# Patient Record
Sex: MALE | Race: WHITE | Employment: OTHER | URBAN - METROPOLITAN AREA
[De-identification: names, ages, dates, MRNs, and addresses within clinical notes are randomized per-mention and may not be internally consistent; named-entity substitution may affect disease eponyms.]

---

## 2017-02-23 ENCOUNTER — OFFICE VISIT (OUTPATIENT)
Dept: FAMILY MEDICINE CLINIC | Facility: CLINIC | Age: 72
End: 2017-02-23

## 2017-02-23 ENCOUNTER — APPOINTMENT (OUTPATIENT)
Dept: LAB | Age: 72
End: 2017-02-23
Attending: FAMILY MEDICINE
Payer: COMMERCIAL

## 2017-02-23 VITALS
HEART RATE: 60 BPM | WEIGHT: 228 LBS | HEIGHT: 67.75 IN | BODY MASS INDEX: 34.96 KG/M2 | DIASTOLIC BLOOD PRESSURE: 70 MMHG | SYSTOLIC BLOOD PRESSURE: 134 MMHG

## 2017-02-23 DIAGNOSIS — E66.9 OBESITY (BMI 30.0-34.9): ICD-10-CM

## 2017-02-23 DIAGNOSIS — E11.9 TYPE 2 DIABETES MELLITUS WITHOUT COMPLICATION, WITHOUT LONG-TERM CURRENT USE OF INSULIN (HCC): ICD-10-CM

## 2017-02-23 DIAGNOSIS — Z23 NEED FOR VACCINATION: ICD-10-CM

## 2017-02-23 DIAGNOSIS — R53.83 FATIGUE, UNSPECIFIED TYPE: ICD-10-CM

## 2017-02-23 DIAGNOSIS — E11.9 TYPE 2 DIABETES MELLITUS WITHOUT COMPLICATION, WITHOUT LONG-TERM CURRENT USE OF INSULIN (HCC): Primary | ICD-10-CM

## 2017-02-23 DIAGNOSIS — I10 ESSENTIAL HYPERTENSION: ICD-10-CM

## 2017-02-23 DIAGNOSIS — G47.33 OBSTRUCTIVE SLEEP APNEA: ICD-10-CM

## 2017-02-23 LAB
EST. AVERAGE GLUCOSE BLD GHB EST-MCNC: 169 MG/DL (ref 68–126)
HBA1C MFR BLD HPLC: 7.5 % (ref ?–5.7)

## 2017-02-23 PROCEDURE — 90471 IMMUNIZATION ADMIN: CPT | Performed by: FAMILY MEDICINE

## 2017-02-23 PROCEDURE — 36415 COLL VENOUS BLD VENIPUNCTURE: CPT

## 2017-02-23 PROCEDURE — 90670 PCV13 VACCINE IM: CPT | Performed by: FAMILY MEDICINE

## 2017-02-23 PROCEDURE — 83036 HEMOGLOBIN GLYCOSYLATED A1C: CPT

## 2017-02-23 PROCEDURE — 99203 OFFICE O/P NEW LOW 30 MIN: CPT | Performed by: FAMILY MEDICINE

## 2017-02-23 NOTE — PROGRESS NOTES
HPI:   #1 DM control uncertain  James Olivier is a 73year old male who presents as a new patient for recheck of his diabetes. Patient’s FBS have been not done patient does not have a meter at home.   Has not been to diabetic or dietitian counseling wo mother passed at 80 natural causes grandparents history not available      Component      Latest Ref Rng 12/27/2016   WBC      4.0-13.0 x10(3) uL 9.8   RBC      3.80-5.80 x10(6)uL 5.32   Hemoglobin      13.0-17.0 g/dL 15.7   Hematocrit      37.0-53.0 % 48. <2.0   NITRITE, URINE      Negative Negative   LEUKOCYTES      Negative Negative   MICROSCOPIC EXAMINATION       Microscopic not indicated   CHOLESTEROL, TOTAL      <200 mg/dL 189   Triglycerides      <150 mg/dL 102   HDL Cholesterol      >45 mg/dL 59   LD Past Medical History   Diagnosis Date   • Essential hypertension    • Diabetes [facility])           Past Surgical History    HERNIA SURGERY      TONSILLECTOMY      OTHER SURGICAL HISTORY        Social History:   Smoking Status: Never Smoker this Visit:  No prescriptions requested or ordered in this encounter    Imaging & Consults:  PNEUMOCOCCAL VACC, 13 MAURICIO IM  1.  Type 2 diabetes mellitus without complication, without long-term current use of insulin (HCC)    Diabetic control is unknown proba

## 2017-02-23 NOTE — PROGRESS NOTES
Quick Note:    Patient does have an elevated hemoglobin A1c above 7. As discussed in the office since it is elevated refer patient to dietitian and diabetic nurse. We will discuss starting additional medication at his follow-up in 1 week.  Repeat hemoglobin

## 2017-02-24 ENCOUNTER — TELEPHONE (OUTPATIENT)
Dept: FAMILY MEDICINE CLINIC | Facility: CLINIC | Age: 72
End: 2017-02-24

## 2017-02-24 DIAGNOSIS — E66.9 OBESITY (BMI 30.0-34.9): ICD-10-CM

## 2017-02-24 DIAGNOSIS — J45.909 UNCOMPLICATED ASTHMA, UNSPECIFIED ASTHMA SEVERITY: ICD-10-CM

## 2017-02-24 DIAGNOSIS — I25.119 CORONARY ARTERY DISEASE WITH ANGINA PECTORIS, UNSPECIFIED VESSEL OR LESION TYPE, UNSPECIFIED WHETHER NATIVE OR TRANSPLANTED HEART (HCC): ICD-10-CM

## 2017-02-24 DIAGNOSIS — G47.33 OBSTRUCTIVE SLEEP APNEA: ICD-10-CM

## 2017-02-24 DIAGNOSIS — E11.9 TYPE 2 DIABETES MELLITUS WITHOUT COMPLICATION, WITHOUT LONG-TERM CURRENT USE OF INSULIN (HCC): Primary | ICD-10-CM

## 2017-02-24 DIAGNOSIS — I10 ESSENTIAL HYPERTENSION: ICD-10-CM

## 2017-02-24 PROBLEM — E66.811 OBESITY (BMI 30.0-34.9): Status: ACTIVE | Noted: 2017-02-24

## 2017-02-24 NOTE — TELEPHONE ENCOUNTER
----- Message from Aisha Zhao PA-C sent at 2/23/2017  4:22 PM CST -----  Patient does have an elevated hemoglobin A1c above 7. As discussed in the office since it is elevated refer patient to dietitian and diabetic nurse.   We will discuss starting

## 2017-02-24 NOTE — TELEPHONE ENCOUNTER
Referrals in system for dietician and diabetic nurse. Lab in for 3 months. lmom for pt with detailed instructions and left him the number to the diabetic ed center to contact.   Also suggested that if he has any questions he can also discuss this with Frantz

## 2017-02-25 PROBLEM — E11.9 TYPE 2 DIABETES MELLITUS WITHOUT COMPLICATION, WITHOUT LONG-TERM CURRENT USE OF INSULIN (HCC): Status: ACTIVE | Noted: 2017-02-25

## 2017-02-25 PROBLEM — R53.83 FATIGUE: Status: ACTIVE | Noted: 2017-02-25

## 2017-02-28 ENCOUNTER — TELEPHONE (OUTPATIENT)
Dept: FAMILY MEDICINE CLINIC | Facility: CLINIC | Age: 72
End: 2017-02-28

## 2017-02-28 DIAGNOSIS — IMO0001 UNCONTROLLED TYPE 2 DIABETES MELLITUS WITHOUT COMPLICATION, WITHOUT LONG-TERM CURRENT USE OF INSULIN: Primary | ICD-10-CM

## 2017-03-01 NOTE — TELEPHONE ENCOUNTER
Per Fifi Stone PA-C, on 02/28/2017,the patient's eye doctor, Ginger Cantor O.D., was contacted in order to confirm or deny a diagnosis of glaucoma.   The patient's chart lists him as having glaucoma, but his most recent eye exam report stated that g

## 2017-03-02 ENCOUNTER — OFFICE VISIT (OUTPATIENT)
Dept: FAMILY MEDICINE CLINIC | Facility: CLINIC | Age: 72
End: 2017-03-02

## 2017-03-02 VITALS
WEIGHT: 228 LBS | HEIGHT: 67.75 IN | DIASTOLIC BLOOD PRESSURE: 70 MMHG | SYSTOLIC BLOOD PRESSURE: 142 MMHG | BODY MASS INDEX: 34.96 KG/M2 | HEART RATE: 68 BPM

## 2017-03-02 DIAGNOSIS — I51.7 LVH (LEFT VENTRICULAR HYPERTROPHY): ICD-10-CM

## 2017-03-02 DIAGNOSIS — R53.83 FATIGUE, UNSPECIFIED TYPE: ICD-10-CM

## 2017-03-02 DIAGNOSIS — Z11.59 NEED FOR HEPATITIS C SCREENING TEST: ICD-10-CM

## 2017-03-02 DIAGNOSIS — E66.9 OBESITY (BMI 30.0-34.9): ICD-10-CM

## 2017-03-02 DIAGNOSIS — H91.8X9 OTHER SPECIFIED FORMS OF HEARING LOSS, UNSPECIFIED LATERALITY: ICD-10-CM

## 2017-03-02 DIAGNOSIS — G47.33 OBSTRUCTIVE SLEEP APNEA: ICD-10-CM

## 2017-03-02 DIAGNOSIS — R93.1 ABNORMAL SCREENING CT OF HEART: ICD-10-CM

## 2017-03-02 DIAGNOSIS — D12.6 BENIGN NEOPLASM OF COLON, UNSPECIFIED PART OF COLON: ICD-10-CM

## 2017-03-02 DIAGNOSIS — Z00.00 ENCOUNTER FOR ANNUAL HEALTH EXAMINATION: Primary | ICD-10-CM

## 2017-03-02 DIAGNOSIS — I25.83 CORONARY ATHEROSCLEROSIS DUE TO LIPID RICH PLAQUE: ICD-10-CM

## 2017-03-02 DIAGNOSIS — I25.10 CORONARY ATHEROSCLEROSIS DUE TO LIPID RICH PLAQUE: ICD-10-CM

## 2017-03-02 DIAGNOSIS — J45.909 UNCOMPLICATED ASTHMA, UNSPECIFIED ASTHMA SEVERITY: ICD-10-CM

## 2017-03-02 DIAGNOSIS — I10 ESSENTIAL HYPERTENSION: ICD-10-CM

## 2017-03-02 DIAGNOSIS — IMO0001 UNCONTROLLED TYPE 2 DIABETES MELLITUS WITHOUT COMPLICATION, WITHOUT LONG-TERM CURRENT USE OF INSULIN: ICD-10-CM

## 2017-03-02 PROBLEM — H91.90 IMPAIRED HEARING: Status: ACTIVE | Noted: 2017-03-02

## 2017-03-02 PROBLEM — E11.9 TYPE 2 DIABETES MELLITUS WITHOUT COMPLICATION, WITHOUT LONG-TERM CURRENT USE OF INSULIN (HCC): Status: RESOLVED | Noted: 2017-02-25 | Resolved: 2017-03-02

## 2017-03-02 PROCEDURE — G0438 PPPS, INITIAL VISIT: HCPCS | Performed by: FAMILY MEDICINE

## 2017-03-02 NOTE — TELEPHONE ENCOUNTER
Per Della Aguilar PA-C, , the patient's previous PCP, Cindy Valentine MD, FACP, was contacted in order to request any cariology testing or consults that we could review to determine if the patient has CAD and/or triple vessel disease.   As it stands, Mi

## 2017-03-02 NOTE — PROGRESS NOTES
HPI:   Fredi Baum is a 73year old male who presents for a Medicare Initial Annual Wellness visit (Once after 12 month Medicare anniversery) . 1.  Essential hypertension  LVH  - Taking lisinopril 5 mg Daily   - Last echo with LVH in 2013, will re Problem List:     Essential hypertension     Asthma     Benign neoplasm of colon     Obstructive sleep apnea     Obesity (BMI 30.0-34. 9)     Fatigue     Uncontrolled type 2 diabetes mellitus without complication, without long-term current use of insulin (H includes tonsillectomy; wrist fracture surgery (Left, 4605-7118); hernia surgery (1697-2884); and colonoscopy (2017). His family history includes Colon Problems in his father. SOCIAL HISTORY:   He  reports that he has never smoked.  He has never used s Clear to auscultation bilaterally, respirations unlabored   Chest Wall:  No tenderness or deformity   Heart:  Regular rate and rhythm, S1, S2 normal, no murmur, rub or gallop   Abdomen:   Soft, non-tender, bowel sounds active all four quadrants,  no masses (CPT=93306); Future    7. Abnormal screening CT of heart  Coronary atherosclerosis due to lipid rich plaque  Repeat Ultrafast CT of the heart last one was done at [place] [address] on 2/27/1 to an calcification score was 132  8.  Benign neoplasm o you take aspirin?: Yes    Have you had any immunizations at another office such as Influenza, Hepatitis B, Tetanus, or Pneumococcal?: No     Functional Ability     Bathing or Showering: Able without help    Toileting: Able without help    Dressing: Able wi \"Flag\": Correct    Recall \"Tree\": Correct       This section provided for quick review of chart, separate sheet to patient  PREVENTATIVE SERVICES  INDICATIONS AND SCHEDULE Internal Lab or Procedure External Lab or Procedure   Diabetes Screening      Hb flowsheet data found. Creatinine  Annually CREATININE (mg/dL)   Date Value   12/27/2016 1.10    No flowsheet data found. Drug Serum Conc  Annually No results found for: DIGOXIN, DIG, VALP No flowsheet data found.     Diabetes      HgbA1C  Annually HGB

## 2017-03-02 NOTE — PATIENT INSTRUCTIONS
SCHEDULING [facility] LAB APPOINTMENTS ONLINE    Lab appointments can now be scheduled online at [email or web address]    · Go to [email or web address]  · In Search type Lab  · Click \"Lab services\"  · Click \"Schedule Your Test Online\"  · Follow the prompts  · If you a member of your immediate family has had colon cancer, especially if their cancer occurred before they were 48years old. Prostate cancer tests:  The older way of looking for prostate cancer, the rectal exam, is no longer regarded as the best way to scre and urine tests. When you have no symptoms of illness, you should discuss the pros and cons of these and other tests with your healthcare provider. Each test involves some expense. What shots do I need?    The following shots are recommended for adults: expect your healthcare provider to advise you regularly on other ways to stay healthy. Some of these may include:   Substance use: Do not use tobacco or illegal drugs.  Avoid using alcohol while driving, swimming, boating, etc.   Diet and exercise: Try to m Family history of diabetes   • Age 72 years or older   • History of gestational diabetes or birth of baby weighing more than 9 pounds     Covered at least every 3 years,           GLUCOSE (mg/dL)   Date Value   12/27/2016 101*   ---------- Medicare covers 75 then as needed or DARRYL annually to 75 PSA due on 12/27/2018  No results found for this or any previous visit.    Annually (age 48 or over), DARRYL not paid separately when covered E/M service is provided on same date    Immunizations      Influenza  Covered computer and printer. (the forms are also available in [address])  [email or web address] org  This link also has information from the [facility] regarding Advance Directives.

## 2017-03-03 NOTE — TELEPHONE ENCOUNTER
A message was left on both the patient's cell and home voicemail notifying him that we received the information we requested from his previous physician. He was asked to call back to go over the information.

## 2017-03-07 NOTE — TELEPHONE ENCOUNTER
Evelyn Aguayo returned your call and said he will be available tomorrow before 10:00 and after 10:40 (he has a meeting) to talk to you about the records you received. He can be reached at [address] [facility].

## 2017-03-08 ENCOUNTER — NURSE ONLY (OUTPATIENT)
Dept: ENDOCRINOLOGY CLINIC | Facility: CLINIC | Age: 72
End: 2017-03-08

## 2017-03-08 VITALS — BODY MASS INDEX: 35 KG/M2 | DIASTOLIC BLOOD PRESSURE: 68 MMHG | WEIGHT: 227 LBS | SYSTOLIC BLOOD PRESSURE: 134 MMHG

## 2017-03-08 DIAGNOSIS — E11.9 TYPE 2 DIABETES MELLITUS WITHOUT COMPLICATION, WITHOUT LONG-TERM CURRENT USE OF INSULIN (HCC): Primary | ICD-10-CM

## 2017-03-08 PROCEDURE — G0108 DIAB MANAGE TRN  PER INDIV: HCPCS | Performed by: DIETITIAN, REGISTERED

## 2017-03-09 NOTE — PROGRESS NOTES
Raymond Boucher  : 1945 attended Step 1 Diabetic Education:    Date: 3/8/2017   Start time: 6:30 End time: 8pm    /68 mmHg  Wt 227 lb      HGBA1C (%)   Date Value   2017 7.5*   ----------     Depression Screen - PHQ-2 SCORE: 0    Diabe Pre-meal  2 Hour Post-prandial 140-180  Demonstrated ability to perform blood glucose testing on: [employer] Cont[employer] next EZ  Glucose today was 126 mg/dL    Problem Solving: Prevention,detection and treatment of acute complications: taught symptoms of hyp

## 2017-03-10 NOTE — TELEPHONE ENCOUNTER
Per Lexi Pulido PA-C, the patient was informed it looks like his previous UFCT from 02/27/2012, they labeled him as having triple vessel disease because he had plaque in is right main coronary artery, left anterior descending coronary artery, and cir

## 2017-03-13 ENCOUNTER — TELEPHONE (OUTPATIENT)
Dept: FAMILY MEDICINE CLINIC | Facility: CLINIC | Age: 72
End: 2017-03-13

## 2017-03-22 ENCOUNTER — HOSPITAL ENCOUNTER (OUTPATIENT)
Dept: CARDIOLOGY CLINIC | Facility: HOSPITAL | Age: 72
Discharge: HOME | End: 2017-03-22
Attending: INTERNAL MEDICINE

## 2017-03-22 DIAGNOSIS — Z13.9 VISIT FOR SCREENING: ICD-10-CM

## 2017-03-23 NOTE — PROGRESS NOTES
Quick Note:    In 1 year repeat carotid artery ultrasound due to carotid stenosis less than 50%. Narrowing is less than 50% both sides.   ______

## 2017-03-24 ENCOUNTER — TELEPHONE (OUTPATIENT)
Dept: FAMILY MEDICINE CLINIC | Facility: CLINIC | Age: 72
End: 2017-03-24

## 2017-03-24 NOTE — TELEPHONE ENCOUNTER
----- Message from Meng Beck PA-C sent at 3/23/2017  5:48 PM CDT -----  In 1 year repeat carotid artery ultrasound due to carotid stenosis less than 50%. Narrowing is less than 50% both sides.

## 2017-03-30 ENCOUNTER — HOSPITAL ENCOUNTER (OUTPATIENT)
Dept: CT IMAGING | Facility: HOSPITAL | Age: 72
Discharge: HOME | End: 2017-03-30
Attending: INTERNAL MEDICINE

## 2017-03-30 DIAGNOSIS — Z13.89 ENCOUNTER FOR SCREENING FOR OTHER DISORDER: ICD-10-CM

## 2017-04-08 NOTE — PROGRESS NOTES
Quick Note:    Patient is to follow-up in the office has several incidental findings:   #1lung nodules small will need repeat CT chest in 12 months if indicated may need a dedicated CT of the chest will discuss at office visit.   #2Degenerative disc disease

## 2017-04-11 ENCOUNTER — TELEPHONE (OUTPATIENT)
Dept: FAMILY MEDICINE CLINIC | Facility: CLINIC | Age: 72
End: 2017-04-11

## 2017-04-11 NOTE — TELEPHONE ENCOUNTER
----- Message from Fifi Stone PA-C sent at 4/8/2017  7:45 AM CDT -----  Patient is to follow-up in the office has several incidental findings:   #1lung nodules small will need repeat CT chest in 12 months if indicated may need a dedicated CT of the

## 2017-04-11 NOTE — TELEPHONE ENCOUNTER
A message was left on the patient's confidential voicemail asking the patient to call back to schedule a follow-up appointment with Marlin Ulrich regarding his test results.

## 2017-04-12 NOTE — TELEPHONE ENCOUNTER
Leonardo Tran returned call. I told him we need to make follow up appointment and Monday may be possible. Can we please put him in Monday Endy Xiong? He said he would be able to come in at 11:30.

## 2017-04-17 ENCOUNTER — OFFICE VISIT (OUTPATIENT)
Dept: FAMILY MEDICINE CLINIC | Facility: CLINIC | Age: 72
End: 2017-04-17

## 2017-04-17 VITALS
BODY MASS INDEX: 34.5 KG/M2 | HEART RATE: 88 BPM | WEIGHT: 225 LBS | HEIGHT: 67.75 IN | DIASTOLIC BLOOD PRESSURE: 70 MMHG | SYSTOLIC BLOOD PRESSURE: 116 MMHG

## 2017-04-17 DIAGNOSIS — R93.1 ABNORMAL CT SCAN, HEART: ICD-10-CM

## 2017-04-17 DIAGNOSIS — I25.83 CORONARY ATHEROSCLEROSIS DUE TO LIPID RICH PLAQUE: ICD-10-CM

## 2017-04-17 DIAGNOSIS — I25.10 CORONARY ATHEROSCLEROSIS DUE TO LIPID RICH PLAQUE: ICD-10-CM

## 2017-04-17 DIAGNOSIS — R91.1 LUNG NODULE < 6CM ON CT: ICD-10-CM

## 2017-04-17 DIAGNOSIS — I77.9 BILATERAL CAROTID ARTERY DISEASE (HCC): ICD-10-CM

## 2017-04-17 DIAGNOSIS — H91.93 HEARING DECREASED, BILATERAL: ICD-10-CM

## 2017-04-17 DIAGNOSIS — I77.810 DILATION OF THORACIC AORTA (HCC): Primary | ICD-10-CM

## 2017-04-17 DIAGNOSIS — M48.10 DISH (DIFFUSE IDIOPATHIC SKELETAL HYPEROSTOSIS): ICD-10-CM

## 2017-04-17 PROCEDURE — 99214 OFFICE O/P EST MOD 30 MIN: CPT | Performed by: FAMILY MEDICINE

## 2017-04-17 NOTE — PROGRESS NOTES
Yolie Connors is a 73year old male. HPI:   Patient is here today reveal Ultrafast CT test results and complains of hearing difficulty. .  Patient is to follow-up in the office has several incidental findings:     #1lung nodule 4 mm no personal history Aorta(__4.0-4.1__cm).  If indicated, recommend CT angiogram of thoracic aorta for further evaluation.    Dictated by: Jennifer Mcguire MD on 4/05/2017          Current Outpatient Prescriptions:  INDIRA MICROLET LANCETS Does not apply Misc Test twice daily Dx rashes  RESPIRATORY: denies shortness of breath with exertion  CARDIOVASCULAR: denies chest pain on exertion  GI: denies abdominal pain and denies heartburn  NEURO: denies headaches  Musculoskeletal: No motor deficits  EXAM:   /70 mmHg  Pulse 88  Ht - Cardio Referral - In Network    4. Abnormal CT scan, heart  - Cardio Referral - In Network  calcification score was 132 in 2012 is now 362 will be referring to cardiology    5. Hearing decreased, bilateral  - ENT Referral - In Network    6.  DISH (diffu

## 2017-04-19 ENCOUNTER — NURSE ONLY (OUTPATIENT)
Dept: ENDOCRINOLOGY CLINIC | Facility: CLINIC | Age: 72
End: 2017-04-19

## 2017-04-19 DIAGNOSIS — IMO0001 UNCONTROLLED TYPE 2 DIABETES MELLITUS WITHOUT COMPLICATION, WITHOUT LONG-TERM CURRENT USE OF INSULIN: Primary | ICD-10-CM

## 2017-04-19 PROCEDURE — G0109 DIAB MANAGE TRN IND/GROUP: HCPCS | Performed by: DIETITIAN, REGISTERED

## 2017-04-20 NOTE — PROGRESS NOTES
Heber Efrem  [id number] attended Step 2 Diabetic Education:  Pt.'s wife accompanied him to class    Date: 4/19/2017  Start time: 5:30pm End time: 7:30pm      Diabetes Overview, pathophysiology, pre-diabetes, A1C results and treatment options for barbie

## 2017-05-08 ENCOUNTER — TELEPHONE (OUTPATIENT)
Dept: FAMILY MEDICINE CLINIC | Facility: CLINIC | Age: 72
End: 2017-05-08

## 2017-05-23 ENCOUNTER — OFFICE VISIT (OUTPATIENT)
Dept: FAMILY MEDICINE CLINIC | Facility: CLINIC | Age: 72
End: 2017-05-23

## 2017-05-23 VITALS
DIASTOLIC BLOOD PRESSURE: 76 MMHG | BODY MASS INDEX: 34.34 KG/M2 | HEART RATE: 84 BPM | HEIGHT: 67.75 IN | SYSTOLIC BLOOD PRESSURE: 140 MMHG | WEIGHT: 224 LBS

## 2017-05-23 DIAGNOSIS — IMO0001 UNCONTROLLED TYPE 2 DIABETES MELLITUS WITHOUT COMPLICATION, WITHOUT LONG-TERM CURRENT USE OF INSULIN: ICD-10-CM

## 2017-05-23 DIAGNOSIS — I10 ESSENTIAL HYPERTENSION: ICD-10-CM

## 2017-05-23 DIAGNOSIS — L84 PRE-ULCERATIVE CORN OR CALLOUS: Primary | ICD-10-CM

## 2017-05-23 PROCEDURE — 99213 OFFICE O/P EST LOW 20 MIN: CPT | Performed by: FAMILY MEDICINE

## 2017-05-23 NOTE — PROGRESS NOTES
Sarah Faustin is a 73year old male. HPI:   #1 callus on right small toe  10 days ago developed a callous and thickening of the skin small toe patient does walk a lot and does have diabetes is concerned.   Has no numbness, tingling or pain unless the sh Oral Tab Take 1 tablet by mouth daily.  Disp:  Rfl:    METFORMIN HCL 1000 MG Oral Tab TAKE 1 TABLET BY MOUTH TWICE DAILY Disp: 60 tablet Rfl: 5      Past Medical History   Diagnosis Date   • Essential hypertension    • Diabetes ([facility] [address]    • Wrist fracture, le 3      Sig: Take 10 mg by mouth every morning. Imaging & Consults:  None  1.  Pre-ulcerative corn or callous  If the corn reappears patient will be referred to dermatology may need orthotics patient tolerated procedure well and did help with the d

## 2017-05-24 ENCOUNTER — NURSE ONLY (OUTPATIENT)
Dept: ENDOCRINOLOGY CLINIC | Facility: CLINIC | Age: 72
End: 2017-05-24

## 2017-05-24 DIAGNOSIS — IMO0001 UNCONTROLLED TYPE 2 DIABETES MELLITUS WITHOUT COMPLICATION, WITHOUT LONG-TERM CURRENT USE OF INSULIN: Primary | ICD-10-CM

## 2017-05-24 PROCEDURE — G0109 DIAB MANAGE TRN IND/GROUP: HCPCS | Performed by: DIETITIAN, REGISTERED

## 2017-05-26 NOTE — PROGRESS NOTES
Rosina Metcalf  [id number] attended Step 3 Diabetic Education:    Date: 5/24/2017  Start time: 5:30pm End time: 7:30pm    Prevention, detection and treatment of chronic complications  Reviewed how to reduce risks of complications including eye, foot, d

## 2017-07-27 ENCOUNTER — TELEPHONE (OUTPATIENT)
Dept: FAMILY MEDICINE CLINIC | Facility: CLINIC | Age: 72
End: 2017-07-27

## 2017-08-10 DIAGNOSIS — IMO0001 UNCONTROLLED TYPE 2 DIABETES MELLITUS WITHOUT COMPLICATION, WITHOUT LONG-TERM CURRENT USE OF INSULIN: ICD-10-CM

## 2017-08-11 RX ORDER — EMPAGLIFLOZIN 10 MG/1
1 TABLET, FILM COATED ORAL DAILY
Qty: 30 TABLET | Refills: 0 | Status: SHIPPED | OUTPATIENT
Start: 2017-08-11 | End: 2017-09-10

## 2017-09-08 ENCOUNTER — PATIENT OUTREACH (OUTPATIENT)
Dept: CASE MANAGEMENT | Age: 72
End: 2017-09-08

## 2017-10-03 ENCOUNTER — PATIENT OUTREACH (OUTPATIENT)
Dept: CASE MANAGEMENT | Age: 72
End: 2017-10-03

## 2017-10-03 NOTE — PROGRESS NOTES
Contacted Pt to intro to CCM. Pt was interested in the program but not ready to sign up as of now. Please send Pt info in the mail regarding program and f/u in a few weeks.

## 2017-10-25 ENCOUNTER — PATIENT OUTREACH (OUTPATIENT)
Dept: CASE MANAGEMENT | Age: 72
End: 2017-10-25

## 2017-10-25 NOTE — PROGRESS NOTES
Attempted contacting pt to f/up with CCM info that was sent. No answer, will try back sometime next week.

## 2017-11-01 ENCOUNTER — PATIENT OUTREACH (OUTPATIENT)
Dept: CASE MANAGEMENT | Age: 72
End: 2017-11-01

## 2017-11-01 NOTE — PROGRESS NOTES
Attempted contacting pt to f/up with CCM info that was sent. Phone rang a couple times and stopped. No one was on other end. Will try back sometime next week.

## 2017-11-27 ENCOUNTER — OFFICE VISIT (OUTPATIENT)
Dept: FAMILY MEDICINE CLINIC | Facility: CLINIC | Age: 72
End: 2017-11-27

## 2017-11-27 VITALS
HEART RATE: 88 BPM | HEIGHT: 67.75 IN | TEMPERATURE: 98 F | WEIGHT: 217 LBS | SYSTOLIC BLOOD PRESSURE: 148 MMHG | DIASTOLIC BLOOD PRESSURE: 78 MMHG | BODY MASS INDEX: 33.27 KG/M2

## 2017-11-27 DIAGNOSIS — Z12.5 PROSTATE CANCER SCREENING: ICD-10-CM

## 2017-11-27 DIAGNOSIS — I77.9 BILATERAL CAROTID ARTERY DISEASE (HCC): ICD-10-CM

## 2017-11-27 DIAGNOSIS — H91.93 HEARING PROBLEM OF BOTH EARS: ICD-10-CM

## 2017-11-27 DIAGNOSIS — R93.1 ABNORMAL CT SCAN OF HEART: ICD-10-CM

## 2017-11-27 DIAGNOSIS — I25.10 CORONARY ATHEROSCLEROSIS DUE TO LIPID RICH PLAQUE: ICD-10-CM

## 2017-11-27 DIAGNOSIS — I10 ESSENTIAL HYPERTENSION: ICD-10-CM

## 2017-11-27 DIAGNOSIS — IMO0001 UNCONTROLLED TYPE 2 DIABETES MELLITUS WITHOUT COMPLICATION, WITHOUT LONG-TERM CURRENT USE OF INSULIN: Primary | ICD-10-CM

## 2017-11-27 DIAGNOSIS — Z23 NEED FOR VACCINATION: ICD-10-CM

## 2017-11-27 DIAGNOSIS — I51.7 LVH (LEFT VENTRICULAR HYPERTROPHY): ICD-10-CM

## 2017-11-27 DIAGNOSIS — I25.83 CORONARY ATHEROSCLEROSIS DUE TO LIPID RICH PLAQUE: ICD-10-CM

## 2017-11-27 DIAGNOSIS — I87.8 VENOUS STASIS OF BOTH LOWER EXTREMITIES: ICD-10-CM

## 2017-11-27 DIAGNOSIS — E66.9 OBESITY (BMI 30.0-34.9): ICD-10-CM

## 2017-11-27 DIAGNOSIS — I77.810 DILATION OF THORACIC AORTA (HCC): ICD-10-CM

## 2017-11-27 PROCEDURE — 99214 OFFICE O/P EST MOD 30 MIN: CPT | Performed by: FAMILY MEDICINE

## 2017-11-27 PROCEDURE — 90471 IMMUNIZATION ADMIN: CPT | Performed by: FAMILY MEDICINE

## 2017-11-27 PROCEDURE — 90653 IIV ADJUVANT VACCINE IM: CPT | Performed by: FAMILY MEDICINE

## 2017-11-27 RX ORDER — BLOOD SUGAR DIAGNOSTIC
STRIP MISCELLANEOUS
Qty: 100 STRIP | Refills: 3 | Status: SHIPPED | OUTPATIENT
Start: 2017-11-27 | End: 2018-05-24 | Stop reason: ALTCHOICE

## 2017-11-27 RX ORDER — EMPAGLIFLOZIN 10 MG/1
1 TABLET, FILM COATED ORAL EVERY MORNING
Refills: 3 | COMMUNITY
Start: 2017-10-31 | End: 2018-05-24

## 2017-11-27 NOTE — PROGRESS NOTES
HPI:   James Olivier is a 81year old male who presents for recheck of his diabetes.    Patient’s FBS have been unknown needs new machine or calibrating solution  Patient has not seen a cardiologist yet for abnormal Ultrafast CT of the heart see results image #6. Repeat CT chest in 12 months is    recommended to document stability.     INCIDENTAL FINDING: Dilated Thoracic Aorta(__4.0-4.1__cm).  If indicated, recommend CT angiogram of thoracic aorta for further evaluation.    Dictated by: Low Foster MD MICROLET LANCETS Does not apply Misc Test twice daily Dx Code: 11.65 NIDDM Disp: 1 Box Rfl: 11   Blood Glucose Calibration (INDIRA CONTOUR NEXT CONTROL) Low In Vitro Solution 1 drop by In Vitro route as needed.  Test 1st strip out of each new vial;discard so thyroid normal to palpation  HEENT ears clear bilaterally no signs of cerumen impaction  LUNGS: clear to auscultation no rales, rhonchi or wheezes  CARDIO: RRR without murmur  GI: good BS's,no masses, HSM or tenderness  EXTREMITIES: no cyanosis, clubbing ; check now HgbA1C, check fasting lipids, CMP, and urine microalbumin every 12 months. Advised to lose weight if needed with carbohydrate controlled diet and exercise, refer to Ophthalmology yearly exam required, check feet daily.  - LIPID PANEL;  Future  -

## 2017-11-28 ENCOUNTER — TELEPHONE (OUTPATIENT)
Dept: FAMILY MEDICINE CLINIC | Facility: CLINIC | Age: 72
End: 2017-11-28

## 2017-11-30 NOTE — TELEPHONE ENCOUNTER
On 11/28/2017, an \"Authorization to Use and [address] [facility] Information\" was successfully faxed to General Emir O.D. in order to request a copy of the patient's diabetic eye exam report.

## 2017-12-01 ENCOUNTER — LAB ENCOUNTER (OUTPATIENT)
Dept: LAB | Age: 72
End: 2017-12-01
Attending: FAMILY MEDICINE
Payer: COMMERCIAL

## 2017-12-01 DIAGNOSIS — Z11.59 NEED FOR HEPATITIS C SCREENING TEST: ICD-10-CM

## 2017-12-01 DIAGNOSIS — IMO0001 UNCONTROLLED TYPE 2 DIABETES MELLITUS WITHOUT COMPLICATION, WITHOUT LONG-TERM CURRENT USE OF INSULIN: ICD-10-CM

## 2017-12-01 DIAGNOSIS — E66.9 OBESITY (BMI 30.0-34.9): ICD-10-CM

## 2017-12-01 DIAGNOSIS — Z12.5 PROSTATE CANCER SCREENING: ICD-10-CM

## 2017-12-01 DIAGNOSIS — I10 ESSENTIAL HYPERTENSION: ICD-10-CM

## 2017-12-01 PROCEDURE — 84153 ASSAY OF PSA TOTAL: CPT | Performed by: FAMILY MEDICINE

## 2017-12-01 PROCEDURE — 86803 HEPATITIS C AB TEST: CPT | Performed by: FAMILY MEDICINE

## 2017-12-01 PROCEDURE — 80061 LIPID PANEL: CPT | Performed by: FAMILY MEDICINE

## 2017-12-01 PROCEDURE — 82043 UR ALBUMIN QUANTITATIVE: CPT | Performed by: FAMILY MEDICINE

## 2017-12-01 PROCEDURE — 36415 COLL VENOUS BLD VENIPUNCTURE: CPT | Performed by: FAMILY MEDICINE

## 2017-12-01 PROCEDURE — 82570 ASSAY OF URINE CREATININE: CPT | Performed by: FAMILY MEDICINE

## 2017-12-01 PROCEDURE — 82306 VITAMIN D 25 HYDROXY: CPT | Performed by: FAMILY MEDICINE

## 2017-12-01 PROCEDURE — 84439 ASSAY OF FREE THYROXINE: CPT | Performed by: FAMILY MEDICINE

## 2017-12-01 PROCEDURE — 80050 GENERAL HEALTH PANEL: CPT | Performed by: FAMILY MEDICINE

## 2017-12-01 PROCEDURE — 83036 HEMOGLOBIN GLYCOSYLATED A1C: CPT | Performed by: FAMILY MEDICINE

## 2017-12-03 NOTE — PROGRESS NOTES
HBA1C is better continue with same medications for diabetes. Cholesterol is not under good control with Lipitor 20 mg would recommend changing to Crestor 10 mg LDL goal is less than 100 is presently at 137.   With the change to Crestor repeat lipids and

## 2017-12-04 ENCOUNTER — TELEPHONE (OUTPATIENT)
Dept: FAMILY MEDICINE CLINIC | Facility: CLINIC | Age: 72
End: 2017-12-04

## 2017-12-04 DIAGNOSIS — Z79.899 MEDICATION MANAGEMENT: ICD-10-CM

## 2017-12-04 DIAGNOSIS — E78.5 HYPERLIPIDEMIA LDL GOAL <100: ICD-10-CM

## 2017-12-04 DIAGNOSIS — E11.9 CONTROLLED TYPE 2 DIABETES MELLITUS WITHOUT COMPLICATION, WITHOUT LONG-TERM CURRENT USE OF INSULIN (HCC): Primary | ICD-10-CM

## 2017-12-04 DIAGNOSIS — R79.89 ABNORMAL CBC MEASUREMENT: ICD-10-CM

## 2017-12-04 NOTE — TELEPHONE ENCOUNTER
A message was left on the patient's home voicemail since his there was no asnwer on his confidential voicemail. The patient was asked to call a back to go over his test results.   An order was placed for a Lipid, CMP, and CBC to be done in 3 months as well

## 2017-12-04 NOTE — TELEPHONE ENCOUNTER
----- Message from Emily Munguia PA-C sent at 12/2/2017 10:31 PM CST -----  HBA1C is better continue with same medications for diabetes.     Cholesterol is not under good control with Lipitor 20 mg would recommend changing to Crestor 10 mg LDL goal is l

## 2017-12-07 RX ORDER — ROSUVASTATIN CALCIUM 10 MG/1
10 TABLET, COATED ORAL NIGHTLY
Qty: 30 TABLET | Refills: 4 | Status: SHIPPED | OUTPATIENT
Start: 2017-12-07 | End: 2018-10-02

## 2017-12-07 NOTE — TELEPHONE ENCOUNTER
lmom for pt with results/instructions. Asked pt after reviewing message to call office with any questions.   Med sent to pharmacy

## 2017-12-13 ENCOUNTER — NURSE ONLY (OUTPATIENT)
Dept: ENDOCRINOLOGY CLINIC | Facility: CLINIC | Age: 72
End: 2017-12-13

## 2017-12-13 DIAGNOSIS — IMO0001 UNCONTROLLED TYPE 2 DIABETES MELLITUS WITHOUT COMPLICATION, WITHOUT LONG-TERM CURRENT USE OF INSULIN: ICD-10-CM

## 2017-12-13 PROCEDURE — G0108 DIAB MANAGE TRN  PER INDIV: HCPCS | Performed by: DIETITIAN, REGISTERED

## 2017-12-13 RX ORDER — BLOOD-GLUCOSE CONTROL, NORMAL
EACH MISCELLANEOUS
Qty: 1 EACH | Refills: 3 | Status: SHIPPED | OUTPATIENT
Start: 2017-12-13 | End: 2018-10-04

## 2017-12-13 RX ORDER — BLOOD SUGAR DIAGNOSTIC
STRIP MISCELLANEOUS
Qty: 100 STRIP | Refills: 11 | Status: SHIPPED | OUTPATIENT
Start: 2017-12-13 | End: 2018-10-04

## 2017-12-14 VITALS — BODY MASS INDEX: 34 KG/M2 | WEIGHT: 220 LBS

## 2017-12-22 ENCOUNTER — TELEPHONE (OUTPATIENT)
Dept: FAMILY MEDICINE CLINIC | Facility: CLINIC | Age: 72
End: 2017-12-22

## 2017-12-22 NOTE — TELEPHONE ENCOUNTER
A \"Diabetic Detailed Written Order,\" which was signed by Mikaela Flores PA-C, was successfully faxed to [place].

## 2017-12-29 PROBLEM — H90.3 SENSORY HEARING LOSS, BILATERAL: Status: ACTIVE | Noted: 2017-12-29

## 2018-02-19 ENCOUNTER — TELEPHONE (OUTPATIENT)
Dept: FAMILY MEDICINE CLINIC | Facility: CLINIC | Age: 73
End: 2018-02-19

## 2018-02-26 ENCOUNTER — HOSPITAL ENCOUNTER (OUTPATIENT)
Dept: CV DIAGNOSTICS | Age: 73
Discharge: HOME | End: 2018-02-26
Attending: INTERNAL MEDICINE
Payer: COMMERCIAL

## 2018-02-26 DIAGNOSIS — E78.2 MIXED HYPERLIPIDEMIA: ICD-10-CM

## 2018-02-26 DIAGNOSIS — I25.10 CORONARY ATHEROSCLEROSIS DUE TO LIPID RICH PLAQUE: ICD-10-CM

## 2018-02-26 DIAGNOSIS — I25.83 CORONARY ATHEROSCLEROSIS DUE TO LIPID RICH PLAQUE: ICD-10-CM

## 2018-02-26 DIAGNOSIS — I77.810 DILATION OF THORACIC AORTA (HCC): ICD-10-CM

## 2018-02-26 DIAGNOSIS — I10 ESSENTIAL HYPERTENSION: ICD-10-CM

## 2018-02-26 PROCEDURE — 93018 CV STRESS TEST I&R ONLY: CPT | Performed by: INTERNAL MEDICINE

## 2018-02-26 PROCEDURE — 78452 HT MUSCLE IMAGE SPECT MULT: CPT | Performed by: INTERNAL MEDICINE

## 2018-02-26 PROCEDURE — 93017 CV STRESS TEST TRACING ONLY: CPT | Performed by: INTERNAL MEDICINE

## 2018-03-29 RX ORDER — EMPAGLIFLOZIN 10 MG/1
TABLET, FILM COATED ORAL
Qty: 30 TABLET | Refills: 0 | Status: SHIPPED | OUTPATIENT
Start: 2018-03-29 | End: 2018-05-24

## 2018-03-29 NOTE — TELEPHONE ENCOUNTER
rx approved qty 30 NR  Patient is past due for a f/u for DM  Please call to schedule appt-will need for any further refills  [phone] (home) [phone] (work)

## 2018-03-30 NOTE — TELEPHONE ENCOUNTER
Spoke with pt's wife and I gave her the information and she will have her  call back to make an appt.

## 2018-04-06 ENCOUNTER — TELEPHONE (OUTPATIENT)
Dept: FAMILY MEDICINE CLINIC | Facility: CLINIC | Age: 73
End: 2018-04-06

## 2018-04-11 NOTE — TELEPHONE ENCOUNTER
Per Fifi Stone PA-C, a message was left on the patient's confidential voicemail notifying him that although Hardwick Cassette did receive a copy of his eye exam, since it is from 2016, he would need to get another one done as we ask our patient's to go every

## 2018-05-11 ENCOUNTER — TELEPHONE (OUTPATIENT)
Dept: FAMILY MEDICINE CLINIC | Facility: CLINIC | Age: 73
End: 2018-05-11

## 2018-05-11 DIAGNOSIS — E11.9 TYPE 2 DIABETES MELLITUS WITHOUT COMPLICATION, WITHOUT LONG-TERM CURRENT USE OF INSULIN (HCC): Primary | ICD-10-CM

## 2018-05-11 PROBLEM — IMO0001 UNCONTROLLED TYPE 2 DIABETES MELLITUS WITHOUT COMPLICATION, WITHOUT LONG-TERM CURRENT USE OF INSULIN: Status: RESOLVED | Noted: 2017-03-02 | Resolved: 2018-05-11

## 2018-05-11 NOTE — TELEPHONE ENCOUNTER
Pt will schedule for his wellness visit with Ron Valentine after he has his labs done.   Orders are in the system

## 2018-05-13 ENCOUNTER — PATIENT OUTREACH (OUTPATIENT)
Dept: FAMILY MEDICINE CLINIC | Facility: CLINIC | Age: 73
End: 2018-05-13

## 2018-05-14 NOTE — PROGRESS NOTES
I was reviewing health maintenance records and noticed you are due for a diabetic eye exam, a complete physical and pneumonia shot.  Please make appointment for complete physical.  When you get your diabetic eye exam tell eye doctor that I need copy of the

## 2018-05-21 ENCOUNTER — LAB ENCOUNTER (OUTPATIENT)
Dept: LAB | Age: 73
End: 2018-05-21
Attending: FAMILY MEDICINE
Payer: COMMERCIAL

## 2018-05-21 DIAGNOSIS — E11.9 TYPE 2 DIABETES MELLITUS WITHOUT COMPLICATION, WITHOUT LONG-TERM CURRENT USE OF INSULIN (HCC): ICD-10-CM

## 2018-05-21 DIAGNOSIS — I10 ESSENTIAL HYPERTENSION: ICD-10-CM

## 2018-05-21 DIAGNOSIS — E78.2 MIXED HYPERLIPIDEMIA: ICD-10-CM

## 2018-05-21 DIAGNOSIS — I25.10 CORONARY ATHEROSCLEROSIS DUE TO LIPID RICH PLAQUE: ICD-10-CM

## 2018-05-21 DIAGNOSIS — I77.810 DILATION OF THORACIC AORTA (HCC): ICD-10-CM

## 2018-05-21 DIAGNOSIS — I25.83 CORONARY ATHEROSCLEROSIS DUE TO LIPID RICH PLAQUE: ICD-10-CM

## 2018-05-21 PROCEDURE — 82570 ASSAY OF URINE CREATININE: CPT | Performed by: FAMILY MEDICINE

## 2018-05-21 PROCEDURE — 82043 UR ALBUMIN QUANTITATIVE: CPT | Performed by: FAMILY MEDICINE

## 2018-05-21 PROCEDURE — 85025 COMPLETE CBC W/AUTO DIFF WBC: CPT | Performed by: FAMILY MEDICINE

## 2018-05-21 PROCEDURE — 83036 HEMOGLOBIN GLYCOSYLATED A1C: CPT | Performed by: FAMILY MEDICINE

## 2018-05-24 ENCOUNTER — OFFICE VISIT (OUTPATIENT)
Dept: FAMILY MEDICINE CLINIC | Facility: CLINIC | Age: 73
End: 2018-05-24

## 2018-05-24 VITALS
HEIGHT: 67 IN | BODY MASS INDEX: 34.37 KG/M2 | SYSTOLIC BLOOD PRESSURE: 130 MMHG | WEIGHT: 219 LBS | DIASTOLIC BLOOD PRESSURE: 64 MMHG | HEART RATE: 96 BPM

## 2018-05-24 DIAGNOSIS — Z00.00 ENCOUNTER FOR ANNUAL HEALTH EXAMINATION: Primary | ICD-10-CM

## 2018-05-24 DIAGNOSIS — Z23 NEED FOR VACCINATION: ICD-10-CM

## 2018-05-24 DIAGNOSIS — M48.10 DISH (DIFFUSE IDIOPATHIC SKELETAL HYPEROSTOSIS): ICD-10-CM

## 2018-05-24 DIAGNOSIS — S16.1XXA STRAIN OF NECK MUSCLE, INITIAL ENCOUNTER: ICD-10-CM

## 2018-05-24 DIAGNOSIS — I10 ESSENTIAL HYPERTENSION: ICD-10-CM

## 2018-05-24 DIAGNOSIS — J45.20 MILD INTERMITTENT ASTHMA WITHOUT COMPLICATION: ICD-10-CM

## 2018-05-24 DIAGNOSIS — H90.3 SENSORY HEARING LOSS, BILATERAL: ICD-10-CM

## 2018-05-24 DIAGNOSIS — I77.810 DILATION OF THORACIC AORTA (HCC): ICD-10-CM

## 2018-05-24 DIAGNOSIS — I25.83 CORONARY ATHEROSCLEROSIS DUE TO LIPID RICH PLAQUE: ICD-10-CM

## 2018-05-24 DIAGNOSIS — R93.1 ABNORMAL CT SCAN, HEART: ICD-10-CM

## 2018-05-24 DIAGNOSIS — R91.1 INCIDENTAL LUNG NODULE, > 3MM AND < 8MM: ICD-10-CM

## 2018-05-24 DIAGNOSIS — IMO0001 UNCONTROLLED TYPE 2 DIABETES MELLITUS WITHOUT COMPLICATION, WITHOUT LONG-TERM CURRENT USE OF INSULIN: ICD-10-CM

## 2018-05-24 DIAGNOSIS — E66.9 OBESITY (BMI 30.0-34.9): ICD-10-CM

## 2018-05-24 DIAGNOSIS — I77.9 BILATERAL CAROTID ARTERY DISEASE (HCC): ICD-10-CM

## 2018-05-24 DIAGNOSIS — Z86.69 HISTORY OF OBSTRUCTIVE SLEEP APNEA: ICD-10-CM

## 2018-05-24 DIAGNOSIS — I25.10 CORONARY ATHEROSCLEROSIS DUE TO LIPID RICH PLAQUE: ICD-10-CM

## 2018-05-24 DIAGNOSIS — I51.7 LVH (LEFT VENTRICULAR HYPERTROPHY): ICD-10-CM

## 2018-05-24 PROCEDURE — 90732 PPSV23 VACC 2 YRS+ SUBQ/IM: CPT | Performed by: FAMILY MEDICINE

## 2018-05-24 PROCEDURE — 90471 IMMUNIZATION ADMIN: CPT | Performed by: FAMILY MEDICINE

## 2018-05-24 PROCEDURE — 99213 OFFICE O/P EST LOW 20 MIN: CPT | Performed by: FAMILY MEDICINE

## 2018-05-24 PROCEDURE — G0439 PPPS, SUBSEQ VISIT: HCPCS | Performed by: FAMILY MEDICINE

## 2018-05-24 NOTE — PROGRESS NOTES
HPI:   Melida Aly is a 81year old male who presents for a Medicare Subsequent Annual Wellness visit (Pt already had Initial Annual Wellness).   Hemoglobin A1c, Urine microalbumin, CBC, lipid, CMP done 5/21/18 patient's here to review.  Wayne Jacinto repeat sleep apnea test was done.     3. Benign neoplasm of colon, unspecified part of colon  - Dad with colon resection and temporary colostomy with re-anastamosis   - Colonoscopy 12/2016 polyp, due for repeat in 3 years 12/30/19     4.  Other specified fo AST 15 15 - 41 U/L   Alt 22 17 - 63 U/L   Bilirubin, Total 0.4 0.1 - 2.0 mg/dL   Total Protein 6.9 6.1 - 8.3 g/dL   Albumin 3.5 3.5 - 4.8 g/dL   Sodium 140 136 - 144 mmol/L   Potassium 4.2 3.6 - 5.1 mmol/L   Chloride 104 101 - 111 mmol/L   CO2 27.0 22.0 see flow sheet section for details.       Depression Screening (PHQ-2/PHQ-9): Over the LAST 2 WEEKS   Little interest or pleasure in doing things (over the last two weeks)?: Not at all  Feeling down, depressed, or hopeless (over the last two weeks)?: Not at Encounters:  05/24/18 : 219 lb  01/31/18 : 217 lb  12/14/17 : 220 lb     Last Cholesterol Labs:     Lab Results  Component Value Date   CHOLEST 145 05/21/2018   HDL 50 05/21/2018   LDL 84 05/21/2018   TRIG 56 05/21/2018          Last Chemistry Labs:     Deb Stevenson fracture surgery (Left, [id number]); hernia surgery ([id number]); and colonoscopy (2017). His family history includes Cancer in his father; Colon Problems in his father; Hypertension in his mother; Lipids in his mother.    SOCIAL HISTORY:   He  reports ricky teeth and gums normal   Neck: Supple, symmetrical, trachea midline, no adenopathy, thyroid: not enlarged, symmetric, no tenderness/mass/nodules, no carotid bruit or JVD   Back:   Symmetric, no curvature, ROM normal, no CVA tenderness   Lungs:   Clear to au weight loss needed. -     Empagliflozin (JARDIANCE) 25 MG Oral Tab; Take 25 mg by mouth daily.   -     OP REFERRAL TO HOME SLEEP APNEA  -     GENERAL SLEEP STUDY TRANSCRIPTION  -     COMP METABOLIC PANEL (14);  Future  -     HEMOGLOBIN A1C; Future    Histo current health state?: Good  How do you maintain positive mental well-being?: Visiting Family    This section provided for quick review of chart, separate sheet to patient  [address] Internal Lab or Procedure External same house as a HepB virus carrier   Homosexual men   Illicit injectable drug abusers     Tetanus Toxoid  Only covered with a cut with metal- TD and TDaP Not covered by Medicare Part B) No vaccine history found This may be covered with your prescription be

## 2018-05-24 NOTE — PATIENT INSTRUCTIONS
SCHEDULING [facility] APPOINTMENTS ONLINE    Lab appointments can now be scheduled online at [email or web address]    · Go to [email or web address]  · In Search type Lab  · Click \"Lab services\"  · Click \"Schedule Your Test Online\"  · Follow the prompts  · If you at the [facility] Visit    Abdominal aortic aneurysm screening (once between ages 73-68)  No results found for this or any previous visit.  Limited to patients who meet one of the following criteria:   • Men who are 73-68 years old and have smoked found for this or any previous visit.  Medium/high risk factors:   End-stage renal disease   Hemophiliacs who received Factor VIII or IX concentrates   Clients of institutions for the mentally retarded   Persons who live in the same house as a HepB virus ca

## 2018-05-26 PROBLEM — R91.1 INCIDENTAL LUNG NODULE, > 3MM AND < 8MM: Status: ACTIVE | Noted: 2018-05-26

## 2018-05-26 PROBLEM — IMO0001 IMPAIRED HEARING: Status: RESOLVED | Noted: 2017-03-02 | Resolved: 2018-05-26

## 2018-05-26 PROBLEM — H91.90 IMPAIRED HEARING: Status: RESOLVED | Noted: 2017-03-02 | Resolved: 2018-05-26

## 2018-07-03 ENCOUNTER — TELEPHONE (OUTPATIENT)
Dept: FAMILY MEDICINE CLINIC | Facility: CLINIC | Age: 73
End: 2018-07-03

## 2018-09-28 ENCOUNTER — LAB ENCOUNTER (OUTPATIENT)
Dept: LAB | Age: 73
End: 2018-09-28
Attending: FAMILY MEDICINE
Payer: COMMERCIAL

## 2018-09-28 DIAGNOSIS — IMO0001 UNCONTROLLED TYPE 2 DIABETES MELLITUS WITHOUT COMPLICATION, WITHOUT LONG-TERM CURRENT USE OF INSULIN: ICD-10-CM

## 2018-09-28 LAB
ALBUMIN SERPL-MCNC: 3.6 G/DL (ref 3.5–4.8)
ALBUMIN/GLOB SERPL: 1 {RATIO} (ref 1–2)
ALP LIVER SERPL-CCNC: 79 U/L (ref 45–117)
ALT SERPL-CCNC: 24 U/L (ref 17–63)
ANION GAP SERPL CALC-SCNC: 5 MMOL/L (ref 0–18)
AST SERPL-CCNC: 15 U/L (ref 15–41)
BILIRUB SERPL-MCNC: 0.7 MG/DL (ref 0.1–2)
BUN BLD-MCNC: 17 MG/DL (ref 8–20)
BUN/CREAT SERPL: 17.9 (ref 10–20)
CALCIUM BLD-MCNC: 8.7 MG/DL (ref 8.3–10.3)
CHLORIDE SERPL-SCNC: 104 MMOL/L (ref 101–111)
CO2 SERPL-SCNC: 30 MMOL/L (ref 22–32)
CREAT BLD-MCNC: 0.95 MG/DL (ref 0.7–1.3)
EST. AVERAGE GLUCOSE BLD GHB EST-MCNC: 160 MG/DL (ref 68–126)
GLOBULIN PLAS-MCNC: 3.5 G/DL (ref 2.5–4)
GLUCOSE BLD-MCNC: 116 MG/DL (ref 70–99)
HBA1C MFR BLD HPLC: 7.2 % (ref ?–5.7)
M PROTEIN MFR SERPL ELPH: 7.1 G/DL (ref 6.1–8.3)
OSMOLALITY SERPL CALC.SUM OF ELEC: 291 MOSM/KG (ref 275–295)
POTASSIUM SERPL-SCNC: 4.3 MMOL/L (ref 3.6–5.1)
SODIUM SERPL-SCNC: 139 MMOL/L (ref 136–144)

## 2018-09-28 PROCEDURE — 83036 HEMOGLOBIN GLYCOSYLATED A1C: CPT | Performed by: FAMILY MEDICINE

## 2018-09-28 PROCEDURE — 80053 COMPREHEN METABOLIC PANEL: CPT | Performed by: FAMILY MEDICINE

## 2018-09-28 PROCEDURE — 36415 COLL VENOUS BLD VENIPUNCTURE: CPT | Performed by: FAMILY MEDICINE

## 2018-09-30 NOTE — PROGRESS NOTES
Hemoglobin A1c 7.2 will discuss at follow-up visit. Liver and kidney tests are normal consider Jardiance for better control blood sugars.   Sent to my chart

## 2018-10-02 RX ORDER — SITAGLIPTIN 100 MG/1
TABLET, FILM COATED ORAL
Qty: 90 TABLET | Refills: 0 | Status: SHIPPED | OUTPATIENT
Start: 2018-10-02 | End: 2019-04-14

## 2018-10-02 RX ORDER — ROSUVASTATIN CALCIUM 10 MG/1
TABLET, COATED ORAL
Qty: 90 TABLET | Refills: 0 | Status: SHIPPED | OUTPATIENT
Start: 2018-10-02 | End: 2019-01-28

## 2018-10-04 ENCOUNTER — OFFICE VISIT (OUTPATIENT)
Dept: FAMILY MEDICINE CLINIC | Facility: CLINIC | Age: 73
End: 2018-10-04
Payer: COMMERCIAL

## 2018-10-04 VITALS
HEIGHT: 67.28 IN | HEART RATE: 76 BPM | WEIGHT: 214 LBS | SYSTOLIC BLOOD PRESSURE: 124 MMHG | TEMPERATURE: 98 F | BODY MASS INDEX: 33.2 KG/M2 | DIASTOLIC BLOOD PRESSURE: 68 MMHG

## 2018-10-04 DIAGNOSIS — I25.83 CORONARY ATHEROSCLEROSIS DUE TO LIPID RICH PLAQUE: ICD-10-CM

## 2018-10-04 DIAGNOSIS — Z23 FLU VACCINE NEED: ICD-10-CM

## 2018-10-04 DIAGNOSIS — I51.7 LVH (LEFT VENTRICULAR HYPERTROPHY): ICD-10-CM

## 2018-10-04 DIAGNOSIS — I25.10 CORONARY ATHEROSCLEROSIS DUE TO LIPID RICH PLAQUE: ICD-10-CM

## 2018-10-04 DIAGNOSIS — I87.8 VENOUS STASIS OF BOTH LOWER EXTREMITIES: ICD-10-CM

## 2018-10-04 DIAGNOSIS — E11.9 TYPE 2 DIABETES MELLITUS WITHOUT COMPLICATION, WITHOUT LONG-TERM CURRENT USE OF INSULIN (HCC): Primary | ICD-10-CM

## 2018-10-04 DIAGNOSIS — I77.810 DILATION OF THORACIC AORTA (HCC): ICD-10-CM

## 2018-10-04 DIAGNOSIS — I65.23 BILATERAL CAROTID ARTERY STENOSIS: ICD-10-CM

## 2018-10-04 DIAGNOSIS — E66.9 OBESITY (BMI 30.0-34.9): ICD-10-CM

## 2018-10-04 PROCEDURE — 90471 IMMUNIZATION ADMIN: CPT | Performed by: FAMILY MEDICINE

## 2018-10-04 PROCEDURE — 99214 OFFICE O/P EST MOD 30 MIN: CPT | Performed by: FAMILY MEDICINE

## 2018-10-04 PROCEDURE — 90653 IIV ADJUVANT VACCINE IM: CPT | Performed by: FAMILY MEDICINE

## 2018-10-04 RX ORDER — EMPAGLIFLOZIN 25 MG/1
1 TABLET, FILM COATED ORAL DAILY
COMMUNITY
Start: 2018-10-02 | End: 2019-09-09

## 2018-10-04 NOTE — PROGRESS NOTES
HPI:   Kerri Stoner is a 81year old male who presents for recheck of his diabetes, hypertension and hyperlipidemia.    #1 follow-up on diabetes  Patient’s FBS have been not checking is going to look into insurance on cost of monitors his old monitor is right lower lobe nodule 39. 4 mm right middle lobe nodule on image #6. Repeat CT chest in 12 months is    recommended to document stability.   INCIDENTAL FINDING: Dilated Thoracic Aorta(__4.0-4.1__cm).  If indicated, recommend CT angiogram of thoracic aorta mg/dL                 Immunization History  Administered            Date(s) Administered    DTP                   06/16/1976 07/16/1976 08/12/1976      FLUAD High Dose 72 yr and older (68682)                          11/27/2017  10/04/2018      Measles 1999    shattered wrist from a fall      Past Surgical History:  2017: COLONOSCOPY      Comment:  [facility]  [id number]: HERNIA SURGERY      Comment:  [facility] Stephan Daniels at [facility]  No date: TONSILLECTOMY  7177-4814: WRIST FRACTURE SURGERY;  Left      Comment:  Reconstruc male who presents for a recheck of his diabetes, hypertension and dyslipidemia.      Type 2 diabetes mellitus without complication, without long-term current use of insulin (hcc)  (primary encounter diagnosis)  Bilateral carotid artery stenosis  Coronary at ordered for patient's patient wanted a light compression ordered 18-25 mm per mercury diabetic compression socks calf style    8.  Flu vaccine need  - FLU VACCINE ADJUVANT IM    The patient indicates understanding of these issues and agrees to the plan  The

## 2018-10-08 ENCOUNTER — MED REC SCAN ONLY (OUTPATIENT)
Dept: FAMILY MEDICINE CLINIC | Facility: CLINIC | Age: 73
End: 2018-10-08

## 2018-10-30 ENCOUNTER — PATIENT OUTREACH (OUTPATIENT)
Dept: CASE MANAGEMENT | Age: 73
End: 2018-10-30

## 2018-11-20 ENCOUNTER — TELEPHONE (OUTPATIENT)
Dept: FAMILY MEDICINE CLINIC | Facility: CLINIC | Age: 73
End: 2018-11-20

## 2018-11-20 NOTE — TELEPHONE ENCOUNTER
The patient submitted a letter to Rose Roe asking her opinion on whether or not he should have the following blood testing done through his insurance: Cardio C-reactive Protein, Hepatitis C, and Vitamin D.   A message was left on the patient's voicemail ask

## 2018-11-29 ENCOUNTER — PATIENT OUTREACH (OUTPATIENT)
Dept: CASE MANAGEMENT | Age: 73
End: 2018-11-29

## 2018-12-06 ENCOUNTER — HOSPITAL ENCOUNTER (OUTPATIENT)
Dept: CT IMAGING | Facility: HOSPITAL | Age: 73
Discharge: HOME | End: 2018-12-06
Attending: FAMILY MEDICINE
Payer: COMMERCIAL

## 2018-12-06 DIAGNOSIS — R91.1 INCIDENTAL LUNG NODULE, > 3MM AND < 8MM: ICD-10-CM

## 2018-12-06 PROCEDURE — 71250 CT THORAX DX C-: CPT | Performed by: FAMILY MEDICINE

## 2018-12-07 NOTE — PROGRESS NOTES
Stable previous nodule present in the right middle lobe no further workup. Some nodular scarring probably post infectious or inflammatory no further workup. Stable dilation of the ascending thoracic aorta.   Recommend an annual i.e. yearly follow-up with

## 2019-01-10 ENCOUNTER — PATIENT OUTREACH (OUTPATIENT)
Dept: CASE MANAGEMENT | Age: 74
End: 2019-01-10

## 2019-01-21 ENCOUNTER — PATIENT OUTREACH (OUTPATIENT)
Dept: CASE MANAGEMENT | Age: 74
End: 2019-01-21

## 2019-01-21 NOTE — PROGRESS NOTES
Pt returned my call and l/m to c/b. Called pt to introduce CCM program, left message to call back to discuss.

## 2019-01-28 ENCOUNTER — OFFICE VISIT (OUTPATIENT)
Dept: FAMILY MEDICINE CLINIC | Facility: CLINIC | Age: 74
End: 2019-01-28
Payer: COMMERCIAL

## 2019-01-28 VITALS
HEIGHT: 67.28 IN | SYSTOLIC BLOOD PRESSURE: 142 MMHG | HEART RATE: 92 BPM | DIASTOLIC BLOOD PRESSURE: 78 MMHG | WEIGHT: 220 LBS | BODY MASS INDEX: 34.13 KG/M2

## 2019-01-28 DIAGNOSIS — IMO0001 UNCONTROLLED TYPE 2 DIABETES MELLITUS WITHOUT COMPLICATION, WITHOUT LONG-TERM CURRENT USE OF INSULIN: Primary | ICD-10-CM

## 2019-01-28 DIAGNOSIS — R06.83 SNORING: ICD-10-CM

## 2019-01-28 DIAGNOSIS — E78.5 HYPERLIPIDEMIA LDL GOAL <100: ICD-10-CM

## 2019-01-28 DIAGNOSIS — I10 ESSENTIAL HYPERTENSION: ICD-10-CM

## 2019-01-28 DIAGNOSIS — I25.83 CORONARY ATHEROSCLEROSIS DUE TO LIPID RICH PLAQUE: ICD-10-CM

## 2019-01-28 DIAGNOSIS — I87.2 VENOUS INSUFFICIENCY OF BOTH LOWER EXTREMITIES: ICD-10-CM

## 2019-01-28 DIAGNOSIS — I25.10 CORONARY ATHEROSCLEROSIS DUE TO LIPID RICH PLAQUE: ICD-10-CM

## 2019-01-28 DIAGNOSIS — L84 PRE-ULCERATIVE CORN OR CALLOUS: ICD-10-CM

## 2019-01-28 DIAGNOSIS — Z13.0 SCREENING FOR DEFICIENCY ANEMIA: ICD-10-CM

## 2019-01-28 DIAGNOSIS — I83.93 ASYMPTOMATIC VARICOSE VEINS OF BOTH LOWER EXTREMITIES: ICD-10-CM

## 2019-01-28 DIAGNOSIS — Z12.5 PROSTATE CANCER SCREENING: ICD-10-CM

## 2019-01-28 DIAGNOSIS — Z86.69 HISTORY OF OBSTRUCTIVE SLEEP APNEA: ICD-10-CM

## 2019-01-28 PROBLEM — R91.1 INCIDENTAL LUNG NODULE, > 3MM AND < 8MM: Status: RESOLVED | Noted: 2018-05-26 | Resolved: 2019-01-28

## 2019-01-28 PROBLEM — R53.83 FATIGUE: Status: RESOLVED | Noted: 2017-02-25 | Resolved: 2019-01-28

## 2019-01-28 PROBLEM — E11.9 TYPE 2 DIABETES MELLITUS WITHOUT COMPLICATION, WITHOUT LONG-TERM CURRENT USE OF INSULIN (HCC): Status: RESOLVED | Noted: 2018-05-11 | Resolved: 2019-01-28

## 2019-01-28 PROCEDURE — 99215 OFFICE O/P EST HI 40 MIN: CPT | Performed by: FAMILY MEDICINE

## 2019-01-28 RX ORDER — BLOOD SUGAR DIAGNOSTIC
STRIP MISCELLANEOUS
Qty: 100 STRIP | Refills: 5 | Status: SHIPPED | OUTPATIENT
Start: 2019-01-28 | End: 2020-12-04

## 2019-01-28 RX ORDER — LISINOPRIL 20 MG/1
20 TABLET ORAL DAILY
Qty: 90 TABLET | Refills: 1 | Status: SHIPPED | OUTPATIENT
Start: 2019-01-28 | End: 2019-12-23

## 2019-01-28 RX ORDER — ROSUVASTATIN CALCIUM 10 MG/1
TABLET, COATED ORAL
Qty: 90 TABLET | Refills: 1 | Status: SHIPPED | OUTPATIENT
Start: 2019-01-28 | End: 2019-03-15
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
## 2019-02-07 ENCOUNTER — PATIENT OUTREACH (OUTPATIENT)
Dept: CASE MANAGEMENT | Age: 74
End: 2019-02-07

## 2019-02-07 NOTE — PROGRESS NOTES
Pt returned my call re: CCM program and left message to call back.   I called pt l/m to c/b to intro CCM

## 2019-02-08 ENCOUNTER — PATIENT OUTREACH (OUTPATIENT)
Dept: CASE MANAGEMENT | Age: 74
End: 2019-02-08

## 2019-02-08 NOTE — PROGRESS NOTES
Pt returned my call re: CCM program  Discussed with pt CCM program and what it entails. Discussed with pt last ov with pcp and what testing is order for pt. Pt to get labs done, see cardio, f/u with pcp and will decide if interested in joining.   Pt also

## 2019-02-21 ENCOUNTER — PATIENT OUTREACH (OUTPATIENT)
Dept: CASE MANAGEMENT | Age: 74
End: 2019-02-21

## 2019-03-15 RX ORDER — MONTELUKAST SODIUM 10 MG/1
TABLET ORAL
Qty: 90 TABLET | Refills: 0 | Status: SHIPPED | OUTPATIENT
Start: 2019-03-15 | End: 2019-09-09

## 2019-03-15 RX ORDER — ROSUVASTATIN CALCIUM 10 MG/1
TABLET, COATED ORAL
Qty: 90 TABLET | Refills: 0 | Status: SHIPPED | OUTPATIENT
Start: 2019-03-15 | End: 2019-09-09

## 2019-03-25 ENCOUNTER — PATIENT OUTREACH (OUTPATIENT)
Dept: CASE MANAGEMENT | Age: 74
End: 2019-03-25

## 2019-04-14 DIAGNOSIS — IMO0001 UNCONTROLLED TYPE 2 DIABETES MELLITUS WITHOUT COMPLICATION, WITHOUT LONG-TERM CURRENT USE OF INSULIN: ICD-10-CM

## 2019-04-15 RX ORDER — SITAGLIPTIN 100 MG/1
TABLET, FILM COATED ORAL
Qty: 90 TABLET | Refills: 0 | Status: SHIPPED | OUTPATIENT
Start: 2019-04-15 | End: 2019-09-09

## 2019-04-15 RX ORDER — EMPAGLIFLOZIN 25 MG/1
TABLET, FILM COATED ORAL
Qty: 90 TABLET | Refills: 0 | Status: SHIPPED | OUTPATIENT
Start: 2019-04-15 | End: 2019-09-09

## 2019-04-26 ENCOUNTER — PATIENT OUTREACH (OUTPATIENT)
Dept: CASE MANAGEMENT | Age: 74
End: 2019-04-26

## 2019-06-19 ENCOUNTER — TELEPHONE (OUTPATIENT)
Dept: FAMILY MEDICINE CLINIC | Facility: CLINIC | Age: 74
End: 2019-06-19

## 2019-06-19 NOTE — TELEPHONE ENCOUNTER
The patient's signed \"Statement of Certifying Physician for Therapeutic Shoes\" and Progress Note from 1/28/2019 were successfully faxed to [address] [place] [address] at [phone].

## 2019-08-21 ENCOUNTER — TELEPHONE (OUTPATIENT)
Dept: FAMILY MEDICINE CLINIC | Facility: CLINIC | Age: 74
End: 2019-08-21

## 2019-08-21 DIAGNOSIS — IMO0001 UNCONTROLLED TYPE 2 DIABETES MELLITUS WITHOUT COMPLICATION, WITHOUT LONG-TERM CURRENT USE OF INSULIN: Primary | ICD-10-CM

## 2019-08-29 ENCOUNTER — PATIENT MESSAGE (OUTPATIENT)
Dept: FAMILY MEDICINE CLINIC | Facility: CLINIC | Age: 74
End: 2019-08-29

## 2019-08-29 NOTE — TELEPHONE ENCOUNTER
From: Ana Michael  To: Pranay Connor PA-C  Sent: 8/29/2019 12:04 PM CDT  Subject: Other    I have an appointment Monday 9th  But I want to get a blood draw first.  Isn't there some paperwork I have to have or just walk in.   Next week (won't be on

## 2019-09-03 ENCOUNTER — LAB ENCOUNTER (OUTPATIENT)
Dept: LAB | Age: 74
End: 2019-09-03
Attending: FAMILY MEDICINE
Payer: COMMERCIAL

## 2019-09-03 DIAGNOSIS — Z12.5 PROSTATE CANCER SCREENING: ICD-10-CM

## 2019-09-03 DIAGNOSIS — IMO0001 UNCONTROLLED TYPE 2 DIABETES MELLITUS WITHOUT COMPLICATION, WITHOUT LONG-TERM CURRENT USE OF INSULIN: ICD-10-CM

## 2019-09-03 DIAGNOSIS — E78.5 HYPERLIPIDEMIA LDL GOAL <100: ICD-10-CM

## 2019-09-03 DIAGNOSIS — Z13.0 SCREENING FOR DEFICIENCY ANEMIA: ICD-10-CM

## 2019-09-03 DIAGNOSIS — I25.10 CORONARY ATHEROSCLEROSIS DUE TO LIPID RICH PLAQUE: ICD-10-CM

## 2019-09-03 DIAGNOSIS — I10 ESSENTIAL HYPERTENSION: ICD-10-CM

## 2019-09-03 DIAGNOSIS — I25.83 CORONARY ATHEROSCLEROSIS DUE TO LIPID RICH PLAQUE: ICD-10-CM

## 2019-09-03 LAB
ALBUMIN SERPL-MCNC: 3.6 G/DL (ref 3.4–5)
ALBUMIN/GLOB SERPL: 1.1 {RATIO} (ref 1–2)
ALP LIVER SERPL-CCNC: 67 U/L (ref 45–117)
ALT SERPL-CCNC: 21 U/L (ref 16–61)
ANION GAP SERPL CALC-SCNC: 7 MMOL/L (ref 0–18)
AST SERPL-CCNC: 20 U/L (ref 15–37)
BASOPHILS # BLD AUTO: 0.04 X10(3) UL (ref 0–0.2)
BASOPHILS NFR BLD AUTO: 0.6 %
BILIRUB SERPL-MCNC: 0.6 MG/DL (ref 0.1–2)
BUN BLD-MCNC: 26 MG/DL (ref 7–18)
BUN/CREAT SERPL: 23.4 (ref 10–20)
CALCIUM BLD-MCNC: 8.8 MG/DL (ref 8.5–10.1)
CHLORIDE SERPL-SCNC: 108 MMOL/L (ref 98–112)
CHOLEST SMN-MCNC: 139 MG/DL (ref ?–200)
CO2 SERPL-SCNC: 26 MMOL/L (ref 21–32)
COMPLEXED PSA SERPL-MCNC: 0.59 NG/ML (ref ?–4)
CREAT BLD-MCNC: 1.11 MG/DL (ref 0.7–1.3)
CREAT UR-SCNC: 109 MG/DL
DEPRECATED RDW RBC AUTO: 44.9 FL (ref 35.1–46.3)
EOSINOPHIL # BLD AUTO: 0.19 X10(3) UL (ref 0–0.7)
EOSINOPHIL NFR BLD AUTO: 2.9 %
ERYTHROCYTE [DISTWIDTH] IN BLOOD BY AUTOMATED COUNT: 13.3 % (ref 11–15)
EST. AVERAGE GLUCOSE BLD GHB EST-MCNC: 160 MG/DL (ref 68–126)
GLOBULIN PLAS-MCNC: 3.4 G/DL (ref 2.8–4.4)
GLUCOSE BLD-MCNC: 107 MG/DL (ref 70–99)
HBA1C MFR BLD HPLC: 7.2 % (ref ?–5.7)
HCT VFR BLD AUTO: 50.2 % (ref 39–53)
HDLC SERPL-MCNC: 51 MG/DL (ref 40–59)
HGB BLD-MCNC: 16.2 G/DL (ref 13–17.5)
IMM GRANULOCYTES # BLD AUTO: 0.02 X10(3) UL (ref 0–1)
IMM GRANULOCYTES NFR BLD: 0.3 %
LDLC SERPL CALC-MCNC: 80 MG/DL (ref ?–100)
LYMPHOCYTES # BLD AUTO: 0.63 X10(3) UL (ref 1–4)
LYMPHOCYTES NFR BLD AUTO: 9.6 %
M PROTEIN MFR SERPL ELPH: 7 G/DL (ref 6.4–8.2)
MCH RBC QN AUTO: 29.3 PG (ref 26–34)
MCHC RBC AUTO-ENTMCNC: 32.3 G/DL (ref 31–37)
MCV RBC AUTO: 90.8 FL (ref 80–100)
MICROALBUMIN UR-MCNC: 2.83 MG/DL
MICROALBUMIN/CREAT 24H UR-RTO: 26 UG/MG (ref ?–30)
MONOCYTES # BLD AUTO: 0.63 X10(3) UL (ref 0.1–1)
MONOCYTES NFR BLD AUTO: 9.6 %
NEUTROPHILS # BLD AUTO: 5.05 X10 (3) UL (ref 1.5–7.7)
NEUTROPHILS # BLD AUTO: 5.05 X10(3) UL (ref 1.5–7.7)
NEUTROPHILS NFR BLD AUTO: 77 %
NONHDLC SERPL-MCNC: 88 MG/DL (ref ?–130)
OSMOLALITY SERPL CALC.SUM OF ELEC: 297 MOSM/KG (ref 275–295)
PLATELET # BLD AUTO: 243 10(3)UL (ref 150–450)
POTASSIUM SERPL-SCNC: 4.3 MMOL/L (ref 3.5–5.1)
RBC # BLD AUTO: 5.53 X10(6)UL (ref 3.8–5.8)
SODIUM SERPL-SCNC: 141 MMOL/L (ref 136–145)
T4 FREE SERPL-MCNC: 1.2 NG/DL (ref 0.8–1.7)
TRIGL SERPL-MCNC: 41 MG/DL (ref 30–149)
TSI SER-ACNC: 3.03 MIU/ML (ref 0.36–3.74)
VLDLC SERPL CALC-MCNC: 8 MG/DL (ref 0–30)
WBC # BLD AUTO: 6.6 X10(3) UL (ref 4–11)

## 2019-09-03 PROCEDURE — 82043 UR ALBUMIN QUANTITATIVE: CPT

## 2019-09-03 PROCEDURE — 85025 COMPLETE CBC W/AUTO DIFF WBC: CPT

## 2019-09-03 PROCEDURE — 83036 HEMOGLOBIN GLYCOSYLATED A1C: CPT

## 2019-09-03 PROCEDURE — 80061 LIPID PANEL: CPT

## 2019-09-03 PROCEDURE — 84439 ASSAY OF FREE THYROXINE: CPT

## 2019-09-03 PROCEDURE — 36415 COLL VENOUS BLD VENIPUNCTURE: CPT

## 2019-09-03 PROCEDURE — 82570 ASSAY OF URINE CREATININE: CPT

## 2019-09-03 PROCEDURE — 84443 ASSAY THYROID STIM HORMONE: CPT

## 2019-09-03 PROCEDURE — 80053 COMPREHEN METABOLIC PANEL: CPT

## 2019-09-04 NOTE — PROGRESS NOTES
Hemoglobin A1c is same as last check at 7.2,  will discuss at office visit.   Rest of test results are essentially normal.

## 2019-09-09 ENCOUNTER — OFFICE VISIT (OUTPATIENT)
Dept: FAMILY MEDICINE CLINIC | Facility: CLINIC | Age: 74
End: 2019-09-09
Payer: COMMERCIAL

## 2019-09-09 VITALS
BODY MASS INDEX: 32.73 KG/M2 | WEIGHT: 211 LBS | TEMPERATURE: 98 F | HEART RATE: 72 BPM | SYSTOLIC BLOOD PRESSURE: 98 MMHG | HEIGHT: 67.28 IN | DIASTOLIC BLOOD PRESSURE: 68 MMHG

## 2019-09-09 DIAGNOSIS — Z12.11 COLON CANCER SCREENING: ICD-10-CM

## 2019-09-09 DIAGNOSIS — J45.20 MILD INTERMITTENT ASTHMA WITHOUT COMPLICATION: ICD-10-CM

## 2019-09-09 DIAGNOSIS — Z23 FLU VACCINE NEED: ICD-10-CM

## 2019-09-09 DIAGNOSIS — I65.23 BILATERAL CAROTID ARTERY STENOSIS: ICD-10-CM

## 2019-09-09 DIAGNOSIS — R93.1 ABNORMAL CT SCAN, HEART: ICD-10-CM

## 2019-09-09 DIAGNOSIS — Z86.010 HISTORY OF COLON POLYPS: ICD-10-CM

## 2019-09-09 DIAGNOSIS — Z97.4 WEARS HEARING AID: ICD-10-CM

## 2019-09-09 DIAGNOSIS — I77.810 DILATION OF THORACIC AORTA (HCC): ICD-10-CM

## 2019-09-09 DIAGNOSIS — L84 PRE-ULCERATIVE CORN OR CALLOUS: ICD-10-CM

## 2019-09-09 DIAGNOSIS — IMO0001 UNCONTROLLED TYPE 2 DIABETES MELLITUS WITHOUT COMPLICATION, WITHOUT LONG-TERM CURRENT USE OF INSULIN: Primary | ICD-10-CM

## 2019-09-09 DIAGNOSIS — E78.5 HYPERLIPIDEMIA LDL GOAL <100: ICD-10-CM

## 2019-09-09 PROBLEM — Z86.0100 HISTORY OF COLON POLYPS: Status: ACTIVE | Noted: 2019-09-09

## 2019-09-09 PROCEDURE — 90662 IIV NO PRSV INCREASED AG IM: CPT | Performed by: FAMILY MEDICINE

## 2019-09-09 PROCEDURE — 99214 OFFICE O/P EST MOD 30 MIN: CPT | Performed by: FAMILY MEDICINE

## 2019-09-09 PROCEDURE — 90471 IMMUNIZATION ADMIN: CPT | Performed by: FAMILY MEDICINE

## 2019-09-09 RX ORDER — ROSUVASTATIN CALCIUM 20 MG/1
20 TABLET, COATED ORAL NIGHTLY
Qty: 90 TABLET | Refills: 1 | Status: SHIPPED | OUTPATIENT
Start: 2019-09-09 | End: 2019-10-22

## 2019-09-09 RX ORDER — MONTELUKAST SODIUM 10 MG/1
10 TABLET ORAL
Qty: 90 TABLET | Refills: 1 | Status: SHIPPED | OUTPATIENT
Start: 2019-09-09 | End: 2020-03-19

## 2019-09-09 NOTE — PROGRESS NOTES
HPI:   Madison Figueroa is a 72year old male who presents for recheck of his diabetes.       #1 follow-up on diabetes  Patient’s FBS have been not checking   Walking 2 miles daily used to walk at least 5K does find it more difficult with leg fatigue but de than 70. LDL is presently at 80.  3/22/17 screening PVD  1. Right ICA = 2*  Left ICA = 2*  2. Your abdominal aorta is normal in size.   3. Your ankle-brachial index is normal at:  1.1  on the right               1.1  on the left  FINDINGS: 4/5/17 UFCT hear Venous insufficiency greater on the right than the left with some mild edema patient denies any pain  Does state he would accept another order for the compression stockings and get them refitted.           Component      Latest Ref Rng & Units 9/3/2019 g/dL 3.4   A/G Ratio      1.0 - 2.0 1.1   Cholesterol, Total      <200 mg/dL 139   HDL Cholesterol      40 - 59 mg/dL 51   Triglycerides      30 - 149 mg/dL 41   LDL Cholesterol Calc      <100 mg/dL 80   VLDL      0 - 30 mg/dL 8   NON HDL CHOL      <130 mg (1,000 mg total) by mouth 2 (two) times daily.  Disp: 180 tablet Rfl: 0   Glucose Blood (GLUCOSE METER TEST) In Vitro Strip Take blood sugar twice daily once in the AM and then 2 hours after eating Disp: 100 strip Rfl: 5   Blood Glucose Monitoring [facility] lesions or rashes  RESPIRATORY: denies shortness of breath with exertion  CARDIOVASCULAR: denies chest pain on exertion  GI: denies abdominal pain and denies heartburn  NEURO: denies headaches  EXT:  No lesions no numbness or tingling  Endo: denies polyuri Prescriptions     Signed Prescriptions Disp Refills   • Rosuvastatin Calcium 20 MG Oral Tab 90 tablet 1     Sig: Take 1 tablet (20 mg total) by mouth nightly.    • Empagliflozin (JARDIANCE) 25 MG Oral Tab 90 tablet 1     Sig: Take 1 tablet by mouth once ashwin tablet; Refill: 1  - LIPID PANEL REFLEX TO DLDL; Future  - LIPID PANEL REFLEX TO DLDL    3. Abnormal CT scan, heart  Follow-up with Dr. Zenia Keller as discussed    4.  Bilateral carotid artery stenosis  Order for carotid artery ultrasound reprinted and given

## 2019-09-24 RX ORDER — ROSUVASTATIN CALCIUM 10 MG/1
TABLET, COATED ORAL
Qty: 90 TABLET | Refills: 0 | OUTPATIENT
Start: 2019-09-24

## 2019-09-26 ENCOUNTER — PATIENT MESSAGE (OUTPATIENT)
Dept: FAMILY MEDICINE CLINIC | Facility: CLINIC | Age: 74
End: 2019-09-26

## 2019-09-26 NOTE — TELEPHONE ENCOUNTER
From: Madalyn Hidalgo  To: Bonita Velazquez PA-C  Sent: 9/26/2019 12:55 PM CDT  Subject: Other    I am sorry Vinh Yost; I don't use this stuff too much and I want to. Didn't you have a summary posted some where of my visit.   I have eye appointment this

## 2019-09-27 ENCOUNTER — TELEPHONE (OUTPATIENT)
Dept: FAMILY MEDICINE CLINIC | Facility: CLINIC | Age: 74
End: 2019-09-27

## 2019-09-27 DIAGNOSIS — I65.23 BILATERAL CAROTID ARTERY STENOSIS: ICD-10-CM

## 2019-09-27 DIAGNOSIS — IMO0001 UNCONTROLLED TYPE 2 DIABETES MELLITUS WITHOUT COMPLICATION, WITHOUT LONG-TERM CURRENT USE OF INSULIN: Primary | ICD-10-CM

## 2019-09-27 NOTE — TELEPHONE ENCOUNTER
Scheduling department called and stated patient tried to schedule his carotid doppler from 10/18 but it has . Re-entered a new carotid doppler.

## 2019-10-10 ENCOUNTER — TELEPHONE (OUTPATIENT)
Dept: FAMILY MEDICINE CLINIC | Facility: CLINIC | Age: 74
End: 2019-10-10

## 2019-10-10 NOTE — TELEPHONE ENCOUNTER
Pt wants to know if he can possibly have his colonoscopy in October vs. December and also wants to know if he can go to [place] and have it done. He doesn't really mind going to [address] [place] [address] but prefers [place].   He said he is just asking and it's not a MUST

## 2019-10-10 NOTE — TELEPHONE ENCOUNTER
Instructed patient to call his gastroenterologist for various office locations. If he needs, we can put in a different referral to a different physician closer to him. He said he is fine staying where he is.

## 2019-10-16 ENCOUNTER — HOSPITAL ENCOUNTER (OUTPATIENT)
Dept: ULTRASOUND IMAGING | Age: 74
Discharge: HOME | End: 2019-10-16
Attending: FAMILY MEDICINE
Payer: COMMERCIAL

## 2019-10-16 DIAGNOSIS — IMO0001 UNCONTROLLED TYPE 2 DIABETES MELLITUS WITHOUT COMPLICATION, WITHOUT LONG-TERM CURRENT USE OF INSULIN: ICD-10-CM

## 2019-10-16 DIAGNOSIS — I65.23 BILATERAL CAROTID ARTERY STENOSIS: ICD-10-CM

## 2019-10-16 PROCEDURE — 93880 EXTRACRANIAL BILAT STUDY: CPT | Performed by: FAMILY MEDICINE

## 2019-10-16 NOTE — PROGRESS NOTES
There is 30% stenosis of the bilateral carotid arteries. Goal is good blood pressure control and good LDL (cholesterol control).  LDL less then 100 right now it is at 80 which is at goal.  Sent to my chart

## 2019-10-19 DIAGNOSIS — IMO0001 UNCONTROLLED TYPE 2 DIABETES MELLITUS WITHOUT COMPLICATION, WITHOUT LONG-TERM CURRENT USE OF INSULIN: Primary | ICD-10-CM

## 2019-10-21 RX ORDER — SITAGLIPTIN 100 MG/1
TABLET, FILM COATED ORAL
Qty: 90 TABLET | Refills: 0 | Status: SHIPPED | OUTPATIENT
Start: 2019-10-21 | End: 2020-01-24

## 2019-10-22 ENCOUNTER — OFFICE VISIT (OUTPATIENT)
Dept: FAMILY MEDICINE CLINIC | Facility: CLINIC | Age: 74
End: 2019-10-22
Payer: COMMERCIAL

## 2019-10-22 VITALS
DIASTOLIC BLOOD PRESSURE: 80 MMHG | WEIGHT: 209 LBS | SYSTOLIC BLOOD PRESSURE: 138 MMHG | HEIGHT: 67.48 IN | BODY MASS INDEX: 32.42 KG/M2 | HEART RATE: 88 BPM

## 2019-10-22 DIAGNOSIS — I65.23 BILATERAL CAROTID ARTERY STENOSIS: ICD-10-CM

## 2019-10-22 DIAGNOSIS — Z86.69 HISTORY OF OBSTRUCTIVE SLEEP APNEA: ICD-10-CM

## 2019-10-22 DIAGNOSIS — I87.2 VENOUS INSUFFICIENCY: ICD-10-CM

## 2019-10-22 DIAGNOSIS — I77.810 DILATION OF THORACIC AORTA (HCC): ICD-10-CM

## 2019-10-22 DIAGNOSIS — I87.2 VENOUS INSUFFICIENCY OF BOTH LOWER EXTREMITIES: ICD-10-CM

## 2019-10-22 DIAGNOSIS — H90.3 SENSORY HEARING LOSS, BILATERAL: ICD-10-CM

## 2019-10-22 DIAGNOSIS — E66.9 OBESITY (BMI 30.0-34.9): ICD-10-CM

## 2019-10-22 DIAGNOSIS — I49.9 IRREGULAR HEART BEAT: ICD-10-CM

## 2019-10-22 DIAGNOSIS — I25.10 CORONARY ATHEROSCLEROSIS DUE TO LIPID RICH PLAQUE: ICD-10-CM

## 2019-10-22 DIAGNOSIS — R93.1 ABNORMAL CT SCAN, HEART: ICD-10-CM

## 2019-10-22 DIAGNOSIS — M48.10 DISH (DIFFUSE IDIOPATHIC SKELETAL HYPEROSTOSIS): ICD-10-CM

## 2019-10-22 DIAGNOSIS — E78.5 HYPERLIPIDEMIA LDL GOAL <70: ICD-10-CM

## 2019-10-22 DIAGNOSIS — R29.898 LEG FATIGUE: ICD-10-CM

## 2019-10-22 DIAGNOSIS — J45.20 MILD INTERMITTENT ASTHMA WITHOUT COMPLICATION: ICD-10-CM

## 2019-10-22 DIAGNOSIS — Z86.010 HISTORY OF COLON POLYPS: ICD-10-CM

## 2019-10-22 DIAGNOSIS — I25.83 CORONARY ATHEROSCLEROSIS DUE TO LIPID RICH PLAQUE: ICD-10-CM

## 2019-10-22 DIAGNOSIS — R09.89 DECREASED FEMORAL PULSES, BILATERAL: ICD-10-CM

## 2019-10-22 DIAGNOSIS — D22.9 MULTIPLE PIGMENTED NEVI: ICD-10-CM

## 2019-10-22 DIAGNOSIS — I10 ESSENTIAL HYPERTENSION: ICD-10-CM

## 2019-10-22 DIAGNOSIS — Z97.4 WEARS HEARING AID: ICD-10-CM

## 2019-10-22 DIAGNOSIS — R91.1 INCIDENTAL LUNG NODULE, > 3MM AND < 8MM: ICD-10-CM

## 2019-10-22 DIAGNOSIS — IMO0001 UNCONTROLLED TYPE 2 DIABETES MELLITUS WITHOUT COMPLICATION, WITHOUT LONG-TERM CURRENT USE OF INSULIN: ICD-10-CM

## 2019-10-22 DIAGNOSIS — R06.83 SNORING: ICD-10-CM

## 2019-10-22 DIAGNOSIS — Z00.00 ENCOUNTER FOR ANNUAL HEALTH EXAMINATION: Primary | ICD-10-CM

## 2019-10-22 PROCEDURE — 99214 OFFICE O/P EST MOD 30 MIN: CPT | Performed by: FAMILY MEDICINE

## 2019-10-22 PROCEDURE — G0439 PPPS, SUBSEQ VISIT: HCPCS | Performed by: FAMILY MEDICINE

## 2019-10-22 PROCEDURE — 93000 ELECTROCARDIOGRAM COMPLETE: CPT | Performed by: FAMILY MEDICINE

## 2019-10-22 RX ORDER — GLIPIZIDE 5 MG/1
5 TABLET ORAL
Qty: 90 TABLET | Refills: 1 | Status: SHIPPED | OUTPATIENT
Start: 2019-10-22 | End: 2020-04-15

## 2019-10-22 RX ORDER — ROSUVASTATIN CALCIUM 40 MG/1
40 TABLET, COATED ORAL NIGHTLY
Qty: 90 TABLET | Refills: 1 | Status: SHIPPED | OUTPATIENT
Start: 2019-10-22 | End: 2020-04-15

## 2019-10-22 NOTE — PATIENT INSTRUCTIONS
Routine Healthcare for Men   Routine checkups can find treatable problems early. For many medical problems, early treatment can help prevent more serious complications. The value of checkups and how often you have them depend mainly on your age.  Your perso controversial. Many studies have been done, but they do not yet show that it is practical to do it on all men at their checkups. The test often gives misleading results and can cause undue anxiety, expense, and unnecessary medical procedures.  You should di whooping cough (pertussis) as well as tetanus. If you are 72 or older, this new vaccine has not yet been approved for your age group.  Because babies are most susceptible to complications from whooping cough, Tdap is especially recommended for adults caring vegetables in your diet. Get regular physical activity or exercise. Injury prevention: Use lap and shoulder belts when you drive. Use a helmet when you ride a motorcycle or bicycle.  If you are around guns or other firearms, practice safe handling and laura Total, LDL and Trigs LDL Cholesterol (mg/dL)   Date Value   09/03/2019 80     Cholesterol, Total (mg/dL)   Date Value   09/03/2019 139     Triglycerides (mg/dL)   Date Value   09/03/2019 41        EKG - covered if needed at Welcome to Medicare, and non-scr every year    Pneumococcal 13 (Prevnar)  Covered Once after 65 Orders placed or performed in visit on 02/23/17   • PNEUMOCOCCAL VACC, 13 MAURICIO IM    Please get once after your 65th birthday    Pneumococcal 23 (Pneumovax)  Covered Once after 65 Orders placed

## 2019-10-22 NOTE — PROGRESS NOTES
HPI:   Nicolette Dacosta is a 72year old male who presents for a Medicare Subsequent Annual Wellness visit (Pt already had Initial Annual Wellness).   Hemoglobin A1c, Urine microalbumin, CBC, lipid, CMP done 9/3/2019 5/21/18 here to review.  Aakash Blake negative A1c done 9/3/2019 at 7.2  -Taking metformin 1000 mg twice daily and Januvia 100 mg with Jardiance 25 mg  --No numbness, tingling or pain to the feet  --No visual impairment  --Eye exam is done yearly with Dr. Vilma Karimi and states last one was 9/27/2019  and no Continued clinical correlation recommended.    Dictated by: Washington Key MD on 2018  Fall/Risk Assessment   He has been screened for Falls and is low risk: Fall/Risk Scorin    Cognitive Assessment     What day of the week is this?: Correct  What mo Coronary atherosclerosis due to lipid rich plaque     Bilateral carotid artery disease (HCC)     Dilation of thoracic aorta (HCC)     Abnormal CT scan, heart     DISH (diffuse idiopathic skeletal hyperostosis)     Sensory hearing loss, bilateral     [facility] BLOOD GLUCOSE METER) w/Device Does not apply Kit, Substitute for any meter covered by plan check blood sugar twice daily once in the AM fasting and 2 hours after eating  lisinopril 20 MG Oral Tab, Take 1 tablet (20 mg total) by mouth daily.   flutica history  ALL/ASTHMA: denies hx of allergy or asthma    EXAM:   /80 (BP Location: Left arm, Patient Position: Sitting, Cuff Size: adult)   Pulse 88   Ht 67.48\"   Wt 209 lb (94.8 kg)   BMI 32.27 kg/m²   Estimated body mass index is 32.27 kg/m² as calc no cyanosis, clubbing or edema; Pulsation pedal pulse exam of both lower legs/feet is diminished.   Bilateral barefoot skin diabetic exam is normal, visualized feet and the appearance is normal.  Bilateral monofilament/sensation of both feet is normal. ([facility]  -     [address]; Future  -     glipiZIDE 5 MG Oral Tab;  Take 1 tablet (5 mg total) by mouth every morning before breakfast.  Foot exams daily, eye exams yearly, add glipizide 5 mg studies ordered. Lab work ordered. Further testing ordered. Consult ordered. No follow-ups on file.      Cari Gutierrez PA-C, 10/22/2019     General Health     In the past six months, have you lost more than 10 pounds without trying?: 2 - No  Has y 02/23/2017 Please get once after your 65th birthday    Pneumococcal 23 (Pneumovax)  Covered Once after 65 05/24/2018 Please get once after your 65th birthday    Hepatitis B for Moderate/High Risk No vaccine history found Medium/high risk factors:   End-sta

## 2019-10-23 PROBLEM — D22.9 MULTIPLE PIGMENTED NEVI: Status: ACTIVE | Noted: 2019-10-23

## 2019-10-23 PROBLEM — L84 PRE-ULCERATIVE CORN OR CALLOUS: Status: RESOLVED | Noted: 2019-01-28 | Resolved: 2019-10-23

## 2019-10-31 PROBLEM — Z80.0 FAMILY HISTORY OF MALIGNANT NEOPLASM OF DIGESTIVE ORGAN: Status: ACTIVE | Noted: 2019-10-31

## 2019-10-31 PROBLEM — D12.3 BENIGN NEOPLASM OF TRANSVERSE COLON: Status: ACTIVE | Noted: 2019-10-31

## 2019-10-31 PROBLEM — Z86.010 PERSONAL HISTORY OF COLONIC POLYPS: Status: ACTIVE | Noted: 2019-10-31

## 2019-10-31 PROBLEM — D12.0 BENIGN NEOPLASM OF CECUM: Status: ACTIVE | Noted: 2019-10-31

## 2019-10-31 PROBLEM — Z86.0100 PERSONAL HISTORY OF COLONIC POLYPS: Status: ACTIVE | Noted: 2019-10-31

## 2019-11-12 ENCOUNTER — HOSPITAL ENCOUNTER (OUTPATIENT)
Dept: CT IMAGING | Age: 74
Discharge: HOME | End: 2019-11-12
Attending: FAMILY MEDICINE
Payer: COMMERCIAL

## 2019-11-12 DIAGNOSIS — Z13.9 ENCOUNTER FOR SCREENING: ICD-10-CM

## 2019-11-12 DIAGNOSIS — I77.810 DILATION OF THORACIC AORTA (HCC): ICD-10-CM

## 2019-11-12 DIAGNOSIS — R91.1 INCIDENTAL LUNG NODULE, > 3MM AND < 8MM: ICD-10-CM

## 2019-11-12 PROCEDURE — 71250 CT THORAX DX C-: CPT | Performed by: FAMILY MEDICINE

## 2019-11-13 NOTE — PROGRESS NOTES
Stable lung nodules no further work-up needed. Stable dilation of the thoracic aorta at 4.2 cm. 2D echocardiogram and 1 year to make sure dilation remains stable.   Order future tests/medications sent to my chart

## 2019-11-14 ENCOUNTER — HOSPITAL ENCOUNTER (OUTPATIENT)
Dept: ULTRASOUND IMAGING | Facility: HOSPITAL | Age: 74
Discharge: HOME | End: 2019-11-14
Attending: FAMILY MEDICINE
Payer: COMMERCIAL

## 2019-11-14 DIAGNOSIS — R29.898 LEG FATIGUE: ICD-10-CM

## 2019-11-14 DIAGNOSIS — R09.89 DECREASED FEMORAL PULSES, BILATERAL: ICD-10-CM

## 2019-11-14 PROCEDURE — 93923 UPR/LXTR ART STDY 3+ LVLS: CPT | Performed by: FAMILY MEDICINE

## 2019-11-14 NOTE — PROGRESS NOTES
Normal bilateral arterial Dopplers. This means that the arterial blood flow supplying oxygen to the legs is good and there is no signs of peripheral artery disease. Sent to my chart.

## 2019-11-16 NOTE — PROGRESS NOTES
Increase calcium in cardiac arteries from last check. Goal is to keep LDL below 70 Crestor was increased at last office visit to 40 mg which should make the difference and help keep you protected.   Repeat lipids on or shortly after 12/9/2019 order in syst

## 2019-11-24 DIAGNOSIS — E11.9 TYPE 2 DIABETES MELLITUS WITHOUT COMPLICATION, WITHOUT LONG-TERM CURRENT USE OF INSULIN (HCC): Primary | ICD-10-CM

## 2019-11-24 DIAGNOSIS — Z00.00 ENCOUNTER FOR ANNUAL HEALTH EXAMINATION: ICD-10-CM

## 2019-11-24 DIAGNOSIS — E66.9 OBESITY (BMI 30.0-34.9): ICD-10-CM

## 2019-11-25 NOTE — TELEPHONE ENCOUNTER
Pt requesting refill of METFORMIN HCL 1000 MG Oral Tab, passed protocol , refill approved, sent to pharmacy

## 2019-12-04 ENCOUNTER — TELEPHONE (OUTPATIENT)
Dept: FAMILY MEDICINE CLINIC | Facility: CLINIC | Age: 74
End: 2019-12-04

## 2019-12-04 NOTE — TELEPHONE ENCOUNTER
Per Viktoria Schmidt PA-C, a message was left on the patient's confidential voicemail asking him to call back to go over some information. An Incidental Finding report was sent to [facility] after his recent Heart Scan to evaluate coronary artery calcium.

## 2019-12-04 NOTE — TELEPHONE ENCOUNTER
The patient called back and he was read Arlene's result note comments verbatim. The patient reported that he thought the Lipid, HgbA1c, and CMP were part of the free blood draw he recently had done.   He is planning on checking on the labs to make sure t

## 2019-12-16 ENCOUNTER — PATIENT MESSAGE (OUTPATIENT)
Dept: FAMILY MEDICINE CLINIC | Facility: CLINIC | Age: 74
End: 2019-12-16

## 2019-12-16 NOTE — TELEPHONE ENCOUNTER
From: Nette Sharma  To: Rebecca Melgoza PA-C  Sent: 12/16/2019 4:19 PM CST  Subject: Non-Urgent Medical Question    Julia Agee:  seeing you thursday a.m. Dr. Beth Santiago office of which you gave me a referral to months ago called.  I can get a soft shoe wit

## 2019-12-19 ENCOUNTER — OFFICE VISIT (OUTPATIENT)
Dept: FAMILY MEDICINE CLINIC | Facility: CLINIC | Age: 74
End: 2019-12-19
Payer: COMMERCIAL

## 2019-12-19 VITALS
HEIGHT: 67.48 IN | RESPIRATION RATE: 24 BRPM | SYSTOLIC BLOOD PRESSURE: 132 MMHG | WEIGHT: 216 LBS | DIASTOLIC BLOOD PRESSURE: 60 MMHG | HEART RATE: 100 BPM | BODY MASS INDEX: 33.51 KG/M2

## 2019-12-19 DIAGNOSIS — I87.2 VENOUS INSUFFICIENCY OF BOTH LOWER EXTREMITIES: ICD-10-CM

## 2019-12-19 DIAGNOSIS — E11.9 TYPE 2 DIABETES MELLITUS WITHOUT COMPLICATION, WITHOUT LONG-TERM CURRENT USE OF INSULIN (HCC): Primary | ICD-10-CM

## 2019-12-19 DIAGNOSIS — E55.9 VITAMIN D DEFICIENCY: ICD-10-CM

## 2019-12-19 DIAGNOSIS — I10 ESSENTIAL HYPERTENSION: ICD-10-CM

## 2019-12-19 DIAGNOSIS — L84 PRE-ULCERATIVE CORN OR CALLOUS: ICD-10-CM

## 2019-12-19 DIAGNOSIS — E66.9 OBESITY (BMI 30.0-34.9): ICD-10-CM

## 2019-12-19 DIAGNOSIS — E78.5 HYPERLIPIDEMIA LDL GOAL <70: ICD-10-CM

## 2019-12-19 DIAGNOSIS — M25.552 LEFT HIP PAIN: ICD-10-CM

## 2019-12-19 PROBLEM — R93.1 ABNORMAL CT SCAN, HEART: Status: RESOLVED | Noted: 2017-04-17 | Resolved: 2019-12-19

## 2019-12-19 PROBLEM — IMO0001 UNCONTROLLED TYPE 2 DIABETES MELLITUS WITHOUT COMPLICATION, WITHOUT LONG-TERM CURRENT USE OF INSULIN: Status: RESOLVED | Noted: 2019-01-28 | Resolved: 2019-12-19

## 2019-12-19 PROBLEM — Z86.010 HISTORY OF COLON POLYPS: Status: RESOLVED | Noted: 2019-09-09 | Resolved: 2019-12-19

## 2019-12-19 PROBLEM — Z86.0100 HISTORY OF COLON POLYPS: Status: RESOLVED | Noted: 2019-09-09 | Resolved: 2019-12-19

## 2019-12-19 PROBLEM — D12.0 BENIGN NEOPLASM OF CECUM: Status: RESOLVED | Noted: 2019-10-31 | Resolved: 2019-12-19

## 2019-12-19 PROBLEM — D12.3 BENIGN NEOPLASM OF TRANSVERSE COLON: Status: RESOLVED | Noted: 2019-10-31 | Resolved: 2019-12-19

## 2019-12-19 PROCEDURE — 99214 OFFICE O/P EST MOD 30 MIN: CPT | Performed by: FAMILY MEDICINE

## 2019-12-19 RX ORDER — ERGOCALCIFEROL 1.25 MG/1
50000 CAPSULE ORAL WEEKLY
Qty: 12 CAPSULE | Refills: 0 | Status: SHIPPED | OUTPATIENT
Start: 2019-12-19 | End: 2020-03-18

## 2019-12-19 NOTE — PROGRESS NOTES
HPI:   Raymond Boucher is a 69year old male who presents for recheck of his diabetes. Patient’s FBS have been 60-70. Last visit with ophthalmologist was 09/27/19. Pt has not been checking his feet on a regular basis.    Pt denies any tingling of th DAILY 180 tablet 1   • Rosuvastatin Calcium 40 MG Oral Tab Take 1 tablet (40 mg total) by mouth nightly.  90 tablet 1   • glipiZIDE 5 MG Oral Tab Take 1 tablet (5 mg total) by mouth every morning before breakfast. 90 tablet 1   • JANUVIA 100 MG Oral Tab MILES Reconstruction w/ Dr. Deedee Garcia at [place] [address] History: Social History    Tobacco Use      Smoking status: Never Smoker      Smokeless tobacco: Never Used    Alcohol use:  Yes      Alcohol/week: 3.0 standard drinks      Types: 3 Cans of beer per week Jose Vyas is a 69year old male who presents for a recheck of his diabetes, hypertension and dyslipidemia.   Type 2 diabetes mellitus without complication, without long-term current use of insulin (hcc)  (primary encounter diagnosis)  Hyperlipidemia ldl goal

## 2019-12-19 NOTE — PROGRESS NOTES
A message was sent to the patient's MyChart with Arlene's result note comments written out verbatim. The patient was instructed to call back or MyChart us with any questions.

## 2019-12-20 ENCOUNTER — TELEPHONE (OUTPATIENT)
Dept: FAMILY MEDICINE CLINIC | Facility: CLINIC | Age: 74
End: 2019-12-20

## 2019-12-20 NOTE — TELEPHONE ENCOUNTER
The patient's completed \"Statement of Certifying Physician for Therapeutic Shoes\" form was successfully faxed to [address] [place] [address] at [phone].

## 2019-12-22 DIAGNOSIS — I10 ESSENTIAL HYPERTENSION: Primary | ICD-10-CM

## 2019-12-23 RX ORDER — LISINOPRIL 20 MG/1
TABLET ORAL
Qty: 90 TABLET | Refills: 1 | Status: SHIPPED | OUTPATIENT
Start: 2019-12-23 | End: 2020-04-24

## 2019-12-23 NOTE — TELEPHONE ENCOUNTER
Pt requesting refill of : LISINOPRIL 20 MG Oral Tab    Passed protocol, refill approved, sent to pharmacy

## 2019-12-31 ENCOUNTER — HOSPITAL ENCOUNTER (OUTPATIENT)
Dept: ULTRASOUND IMAGING | Age: 74
Discharge: HOME | End: 2019-12-31
Attending: FAMILY MEDICINE
Payer: COMMERCIAL

## 2019-12-31 DIAGNOSIS — I25.10 CORONARY ATHEROSCLEROSIS DUE TO LIPID RICH PLAQUE: ICD-10-CM

## 2019-12-31 DIAGNOSIS — I77.810 DILATION OF THORACIC AORTA (HCC): ICD-10-CM

## 2019-12-31 DIAGNOSIS — R93.1 ABNORMAL CT SCAN, HEART: ICD-10-CM

## 2019-12-31 DIAGNOSIS — I25.83 CORONARY ATHEROSCLEROSIS DUE TO LIPID RICH PLAQUE: ICD-10-CM

## 2019-12-31 PROCEDURE — 76706 US ABDL AORTA SCREEN AAA: CPT | Performed by: FAMILY MEDICINE

## 2020-01-20 ENCOUNTER — TELEPHONE (OUTPATIENT)
Dept: FAMILY MEDICINE CLINIC | Facility: CLINIC | Age: 75
End: 2020-01-20

## 2020-01-20 NOTE — TELEPHONE ENCOUNTER
Per Uzma Hughes PA-C, a message was left on the patient's voicemail asking him if he wanted the [facility] Evaluation Results\" back that he had dropped off for [facility].   He was informed that [facility] took a copy of the lab results already for his Prosper Lawson

## 2020-01-24 RX ORDER — SITAGLIPTIN 100 MG/1
TABLET, FILM COATED ORAL
Qty: 90 TABLET | Refills: 1 | Status: SHIPPED | OUTPATIENT
Start: 2020-01-24 | End: 2020-04-24

## 2020-03-02 ENCOUNTER — PATIENT MESSAGE (OUTPATIENT)
Dept: FAMILY MEDICINE CLINIC | Facility: CLINIC | Age: 75
End: 2020-03-02

## 2020-03-03 ENCOUNTER — PATIENT MESSAGE (OUTPATIENT)
Dept: FAMILY MEDICINE CLINIC | Facility: CLINIC | Age: 75
End: 2020-03-03

## 2020-03-03 NOTE — TELEPHONE ENCOUNTER
From: Kerri Session  To:  Louisa Ferrari PA-C  Sent: 3/3/2020 11:13 AM CST  Subject: Non-Urgent Medical Question    OK But I had a blood draw in November through my insurance and she was happy with results and thought it was sufficient    What is [employer]

## 2020-03-03 NOTE — TELEPHONE ENCOUNTER
From: Michael Whiting  To: Francisco Singh PA-C  Sent: 3/2/2020 6:13 PM CST  Subject: Non-Urgent Medical Question    Aayush Perez:  I am just asking if I am supposed to have any thing done in near future. I don't see anything.     I will be out of this wo

## 2020-03-04 ENCOUNTER — PATIENT MESSAGE (OUTPATIENT)
Dept: FAMILY MEDICINE CLINIC | Facility: CLINIC | Age: 75
End: 2020-03-04

## 2020-03-04 NOTE — TELEPHONE ENCOUNTER
From: James Olivier  To: Dawn Queen PA-C  Sent: 3/4/2020 8:20 AM CST  Subject: Non-Urgent Medical Question    OK   I still don't know what [facility] is.     So wait until May; or soon; or office visit first; etc.    Thank You

## 2020-03-09 RX ORDER — ERGOCALCIFEROL 1.25 MG/1
CAPSULE ORAL
Qty: 12 CAPSULE | Refills: 0 | OUTPATIENT
Start: 2020-03-09

## 2020-03-19 DIAGNOSIS — J45.20 MILD INTERMITTENT ASTHMA WITHOUT COMPLICATION: ICD-10-CM

## 2020-03-19 RX ORDER — MONTELUKAST SODIUM 10 MG/1
TABLET ORAL
Qty: 90 TABLET | Refills: 1 | Status: SHIPPED | OUTPATIENT
Start: 2020-03-19 | End: 2020-04-29

## 2020-03-19 NOTE — TELEPHONE ENCOUNTER
Refill request for MONTELUKAST. Last fill 9/9/19. 90 tabs 1 refill. Refill approved and sent to pharmacy.

## 2020-04-15 ENCOUNTER — TELEPHONE (OUTPATIENT)
Dept: FAMILY MEDICINE CLINIC | Facility: CLINIC | Age: 75
End: 2020-04-15

## 2020-04-15 DIAGNOSIS — E11.9 CONTROLLED TYPE 2 DIABETES MELLITUS WITHOUT COMPLICATION, WITHOUT LONG-TERM CURRENT USE OF INSULIN (HCC): ICD-10-CM

## 2020-04-15 DIAGNOSIS — E66.9 OBESITY (BMI 30.0-34.9): ICD-10-CM

## 2020-04-15 DIAGNOSIS — E11.9 TYPE 2 DIABETES MELLITUS WITHOUT COMPLICATION, WITHOUT LONG-TERM CURRENT USE OF INSULIN (HCC): ICD-10-CM

## 2020-04-15 DIAGNOSIS — E78.5 HYPERLIPIDEMIA LDL GOAL <70: ICD-10-CM

## 2020-04-15 RX ORDER — GLIPIZIDE 5 MG/1
5 TABLET ORAL
Qty: 90 TABLET | Refills: 0 | Status: SHIPPED | OUTPATIENT
Start: 2020-04-22 | End: 2020-04-24

## 2020-04-15 RX ORDER — ROSUVASTATIN CALCIUM 40 MG/1
40 TABLET, COATED ORAL NIGHTLY
Qty: 90 TABLET | Refills: 0 | Status: SHIPPED | OUTPATIENT
Start: 2020-04-22 | End: 2020-04-24

## 2020-04-15 NOTE — TELEPHONE ENCOUNTER
Per Dawn Queen PA-C, ok to give a 90 day supply on his medications per request made by the patient's wife, Ngozi Mckeon, during her Telephone Visit yesterday.

## 2020-04-21 ENCOUNTER — PATIENT MESSAGE (OUTPATIENT)
Dept: FAMILY MEDICINE CLINIC | Facility: CLINIC | Age: 75
End: 2020-04-21

## 2020-04-21 DIAGNOSIS — E78.5 HYPERLIPIDEMIA LDL GOAL <70: ICD-10-CM

## 2020-04-21 DIAGNOSIS — E11.9 TYPE 2 DIABETES MELLITUS WITHOUT COMPLICATION, WITHOUT LONG-TERM CURRENT USE OF INSULIN (HCC): ICD-10-CM

## 2020-04-21 DIAGNOSIS — I10 ESSENTIAL HYPERTENSION: ICD-10-CM

## 2020-04-21 DIAGNOSIS — E11.9 CONTROLLED TYPE 2 DIABETES MELLITUS WITHOUT COMPLICATION, WITHOUT LONG-TERM CURRENT USE OF INSULIN (HCC): ICD-10-CM

## 2020-04-21 DIAGNOSIS — E66.9 OBESITY (BMI 30.0-34.9): ICD-10-CM

## 2020-04-21 NOTE — TELEPHONE ENCOUNTER
From: Patt Phalen  To: Perla Bonilla PA-C  Sent: 4/21/2020 3:40 PM CDT  Subject: Prescription Question    [facility]:  I need a drive to the [employer]. A question/inquiry. If there is someone else that needs attention by Valeda Cushing, etc. then OK.     I a

## 2020-04-23 NOTE — TELEPHONE ENCOUNTER
Left message for patient to call back to clarify request.  Need to know what meds and what pharmacy he is requesting refills on

## 2020-04-24 ENCOUNTER — PATIENT MESSAGE (OUTPATIENT)
Dept: FAMILY MEDICINE CLINIC | Facility: CLINIC | Age: 75
End: 2020-04-24

## 2020-04-24 RX ORDER — LISINOPRIL 20 MG/1
20 TABLET ORAL DAILY
Qty: 90 TABLET | Refills: 0 | Status: SHIPPED | OUTPATIENT
Start: 2020-04-24 | End: 2020-04-29

## 2020-04-24 RX ORDER — ROSUVASTATIN CALCIUM 40 MG/1
40 TABLET, COATED ORAL NIGHTLY
Qty: 90 TABLET | Refills: 0 | Status: SHIPPED | OUTPATIENT
Start: 2020-04-24 | End: 2020-04-29

## 2020-04-24 RX ORDER — GLIPIZIDE 5 MG/1
5 TABLET ORAL
Qty: 90 TABLET | Refills: 0 | Status: SHIPPED | OUTPATIENT
Start: 2020-04-24 | End: 2020-04-29

## 2020-04-24 NOTE — TELEPHONE ENCOUNTER
Patient called back. Explained that he has run out of insurance and is requesting one last refill to [facility] until he can get his new insurance figured out. Requesting a 90 day supply of all medications.

## 2020-04-24 NOTE — TELEPHONE ENCOUNTER
From: Heber Topete  To: Donavon Lomax PA-C  Sent: 4/24/2020 8:25 AM CDT  Subject: Prescription Question    Britt Socks:  I responded to someone yesterday; don't see it on listing.   so Grace Morrison and I have bcbs on care; never thought of pills; and agent sa

## 2020-04-28 ENCOUNTER — TELEPHONE (OUTPATIENT)
Dept: FAMILY MEDICINE CLINIC | Facility: CLINIC | Age: 75
End: 2020-04-28

## 2020-04-28 ENCOUNTER — PATIENT MESSAGE (OUTPATIENT)
Dept: FAMILY MEDICINE CLINIC | Facility: CLINIC | Age: 75
End: 2020-04-28

## 2020-04-28 DIAGNOSIS — E78.5 HYPERLIPIDEMIA LDL GOAL <70: ICD-10-CM

## 2020-04-28 DIAGNOSIS — E11.9 CONTROLLED TYPE 2 DIABETES MELLITUS WITHOUT COMPLICATION, WITHOUT LONG-TERM CURRENT USE OF INSULIN (HCC): ICD-10-CM

## 2020-04-28 DIAGNOSIS — E11.9 TYPE 2 DIABETES MELLITUS WITHOUT COMPLICATION, WITHOUT LONG-TERM CURRENT USE OF INSULIN (HCC): ICD-10-CM

## 2020-04-28 DIAGNOSIS — I10 ESSENTIAL HYPERTENSION: ICD-10-CM

## 2020-04-28 DIAGNOSIS — J45.20 MILD INTERMITTENT ASTHMA WITHOUT COMPLICATION: ICD-10-CM

## 2020-04-28 DIAGNOSIS — E66.9 OBESITY (BMI 30.0-34.9): ICD-10-CM

## 2020-04-28 RX ORDER — MONTELUKAST SODIUM 10 MG/1
10 TABLET ORAL DAILY
Qty: 90 TABLET | Refills: 1 | Status: CANCELLED | OUTPATIENT
Start: 2020-04-28

## 2020-04-28 RX ORDER — ROSUVASTATIN CALCIUM 40 MG/1
40 TABLET, COATED ORAL NIGHTLY
Qty: 90 TABLET | Refills: 0 | Status: CANCELLED | OUTPATIENT
Start: 2020-04-28

## 2020-04-28 RX ORDER — LISINOPRIL 20 MG/1
20 TABLET ORAL DAILY
Qty: 90 TABLET | Refills: 0 | Status: CANCELLED | OUTPATIENT
Start: 2020-04-28

## 2020-04-28 RX ORDER — GLIPIZIDE 5 MG/1
5 TABLET ORAL
Qty: 90 TABLET | Refills: 0 | Status: CANCELLED | OUTPATIENT
Start: 2020-04-28

## 2020-04-28 NOTE — TELEPHONE ENCOUNTER
Per Francisco Singh PA-C, the patient's SnapShot was successfully faxed to [address] [place] [address] so that they can add the patient's allergies and health conditions to this patient's file.

## 2020-04-29 RX ORDER — ROSUVASTATIN CALCIUM 40 MG/1
40 TABLET, COATED ORAL NIGHTLY
Qty: 90 TABLET | Refills: 0 | Status: SHIPPED | OUTPATIENT
Start: 2020-04-29 | End: 2020-08-31

## 2020-04-29 RX ORDER — GLIPIZIDE 5 MG/1
5 TABLET ORAL
Qty: 90 TABLET | Refills: 0 | Status: SHIPPED | OUTPATIENT
Start: 2020-04-29 | End: 2020-08-31

## 2020-04-29 RX ORDER — MONTELUKAST SODIUM 10 MG/1
10 TABLET ORAL DAILY
Qty: 90 TABLET | Refills: 0 | Status: SHIPPED | OUTPATIENT
Start: 2020-04-29 | End: 2021-09-15

## 2020-04-29 RX ORDER — LISINOPRIL 20 MG/1
20 TABLET ORAL DAILY
Qty: 90 TABLET | Refills: 0 | Status: SHIPPED | OUTPATIENT
Start: 2020-04-29 | End: 2021-09-15

## 2020-04-29 NOTE — TELEPHONE ENCOUNTER
April 24, 2020   Me        11:16 AM   Note      Scripts sent to [facility] as requested

## 2020-04-29 NOTE — TELEPHONE ENCOUNTER
From: Fredi Baum  To: Marylou Olivier PA-C  Sent: 4/28/2020 5:41 PM CDT  Subject: Prescription Question    Ne Mckee:  2 more days with work. Where the status is of a hoped for 90 day advance on prescriptions?  I filled out and then clicked and they

## 2020-05-01 ENCOUNTER — MED REC SCAN ONLY (OUTPATIENT)
Dept: FAMILY MEDICINE CLINIC | Facility: CLINIC | Age: 75
End: 2020-05-01

## 2020-07-29 DIAGNOSIS — E11.9 TYPE 2 DIABETES MELLITUS WITHOUT COMPLICATION, WITHOUT LONG-TERM CURRENT USE OF INSULIN (HCC): ICD-10-CM

## 2020-07-29 DIAGNOSIS — E66.9 OBESITY (BMI 30.0-34.9): ICD-10-CM

## 2020-07-29 DIAGNOSIS — E78.5 HYPERLIPIDEMIA LDL GOAL <70: ICD-10-CM

## 2020-07-29 DIAGNOSIS — E11.9 CONTROLLED TYPE 2 DIABETES MELLITUS WITHOUT COMPLICATION, WITHOUT LONG-TERM CURRENT USE OF INSULIN (HCC): ICD-10-CM

## 2020-07-29 RX ORDER — GLIPIZIDE 5 MG/1
5 TABLET ORAL
Qty: 90 TABLET | Refills: 0 | OUTPATIENT
Start: 2020-07-29

## 2020-07-29 RX ORDER — SITAGLIPTIN 100 MG/1
TABLET, FILM COATED ORAL
Qty: 90 TABLET | Refills: 0 | OUTPATIENT
Start: 2020-07-29

## 2020-07-29 RX ORDER — ROSUVASTATIN CALCIUM 40 MG/1
TABLET, COATED ORAL
Qty: 90 TABLET | Refills: 0 | OUTPATIENT
Start: 2020-07-29

## 2020-07-29 NOTE — TELEPHONE ENCOUNTER
LOV 12/19/2019. The patient is asked to return in 3 months.     No future appointments scheduled     Last labs (external) 11/2019. Patient to repeat in 6 months.  ..  Rx denied as patient is due for labs and appointment    Message sent to [facility]

## 2020-08-30 DIAGNOSIS — E78.5 HYPERLIPIDEMIA LDL GOAL <70: ICD-10-CM

## 2020-08-30 DIAGNOSIS — E66.9 OBESITY (BMI 30.0-34.9): ICD-10-CM

## 2020-08-30 DIAGNOSIS — E11.9 CONTROLLED TYPE 2 DIABETES MELLITUS WITHOUT COMPLICATION, WITHOUT LONG-TERM CURRENT USE OF INSULIN (HCC): ICD-10-CM

## 2020-08-30 DIAGNOSIS — E11.9 TYPE 2 DIABETES MELLITUS WITHOUT COMPLICATION, WITHOUT LONG-TERM CURRENT USE OF INSULIN (HCC): ICD-10-CM

## 2020-08-31 RX ORDER — ROSUVASTATIN CALCIUM 40 MG/1
TABLET, COATED ORAL
Qty: 30 TABLET | Refills: 0 | Status: SHIPPED | OUTPATIENT
Start: 2020-08-31 | End: 2021-09-15

## 2020-08-31 RX ORDER — SITAGLIPTIN 100 MG/1
TABLET, FILM COATED ORAL
Qty: 30 TABLET | Refills: 0 | Status: SHIPPED | OUTPATIENT
Start: 2020-08-31 | End: 2021-09-15

## 2020-08-31 RX ORDER — GLIPIZIDE 5 MG/1
5 TABLET ORAL
Qty: 30 TABLET | Refills: 0 | Status: SHIPPED | OUTPATIENT
Start: 2020-08-31 | End: 2021-09-15

## 2020-08-31 NOTE — TELEPHONE ENCOUNTER
Last labs 11/27/19  Patient was to repeat labs in 6 months    LOV 12/29/2019. Follow up in 3 months    No future appointments     30 day supply approved and message sent to [employer].

## 2020-09-01 ENCOUNTER — TELEPHONE (OUTPATIENT)
Dept: FAMILY MEDICINE CLINIC | Facility: CLINIC | Age: 75
End: 2020-09-01

## 2020-09-01 NOTE — TELEPHONE ENCOUNTER
A Diabetic Reminder Letter and lab orders were sent to the patient's MyChart as well as his home address.

## 2020-09-15 ENCOUNTER — TELEPHONE (OUTPATIENT)
Dept: FAMILY MEDICINE CLINIC | Facility: CLINIC | Age: 75
End: 2020-09-15

## 2020-09-15 DIAGNOSIS — E11.9 CONTROLLED TYPE 2 DIABETES MELLITUS WITHOUT COMPLICATION, WITHOUT LONG-TERM CURRENT USE OF INSULIN (HCC): ICD-10-CM

## 2020-09-15 DIAGNOSIS — E78.5 HYPERLIPIDEMIA LDL GOAL <70: ICD-10-CM

## 2020-09-15 DIAGNOSIS — E66.9 OBESITY (BMI 30.0-34.9): ICD-10-CM

## 2020-09-15 DIAGNOSIS — E11.9 TYPE 2 DIABETES MELLITUS WITHOUT COMPLICATION, WITHOUT LONG-TERM CURRENT USE OF INSULIN (HCC): ICD-10-CM

## 2020-09-15 RX ORDER — ROSUVASTATIN CALCIUM 40 MG/1
40 TABLET, COATED ORAL NIGHTLY
Qty: 30 TABLET | Refills: 0 | Status: CANCELLED | OUTPATIENT
Start: 2020-09-15

## 2020-09-15 RX ORDER — GLIPIZIDE 5 MG/1
5 TABLET ORAL
Qty: 30 TABLET | Refills: 0 | Status: CANCELLED | OUTPATIENT
Start: 2020-09-15

## 2020-09-15 NOTE — TELEPHONE ENCOUNTER
Received refill requests. Last OV was 12/12/19. Was to repeat labs in may/june. Incomplete. 30 day refills and mychart was sent 8/30/20. Patient needs appointment and to complete labs. LMOM to call office.

## 2020-10-06 ENCOUNTER — APPOINTMENT (OUTPATIENT)
Dept: LAB | Age: 75
End: 2020-10-06
Attending: FAMILY MEDICINE
Payer: MEDICARE

## 2020-10-06 PROCEDURE — 82043 UR ALBUMIN QUANTITATIVE: CPT | Performed by: FAMILY MEDICINE

## 2020-10-06 PROCEDURE — 80061 LIPID PANEL: CPT | Performed by: FAMILY MEDICINE

## 2020-10-06 PROCEDURE — 82570 ASSAY OF URINE CREATININE: CPT | Performed by: FAMILY MEDICINE

## 2020-10-06 PROCEDURE — 36415 COLL VENOUS BLD VENIPUNCTURE: CPT | Performed by: FAMILY MEDICINE

## 2020-10-06 PROCEDURE — 83036 HEMOGLOBIN GLYCOSYLATED A1C: CPT | Performed by: FAMILY MEDICINE

## 2020-10-06 PROCEDURE — 80053 COMPREHEN METABOLIC PANEL: CPT | Performed by: FAMILY MEDICINE

## 2020-10-06 PROCEDURE — 82306 VITAMIN D 25 HYDROXY: CPT | Performed by: FAMILY MEDICINE

## 2020-10-06 NOTE — PROGRESS NOTES
Follow-up in the office to discuss test results.  Looks like he has an appointment with Dr. Merna Potts on the 12th please call him to make sure that that is his intent since he has not seen Dr. Merna Potts before.    Sent to my chart

## 2020-10-07 ENCOUNTER — TELEPHONE (OUTPATIENT)
Dept: FAMILY MEDICINE CLINIC | Facility: CLINIC | Age: 75
End: 2020-10-07

## 2020-10-07 NOTE — TELEPHONE ENCOUNTER
Per Meng Beck PA-C, a message was left on the patient's confidential voicemail asking him to call back if he did not intend to schedule his wellness on 10/12/2020 at 11:30 a.m. with Dr. Nelia Perez so that we can reschedule him with [facility].

## 2020-10-16 ENCOUNTER — OFFICE VISIT (OUTPATIENT)
Dept: FAMILY MEDICINE CLINIC | Facility: CLINIC | Age: 75
End: 2020-10-16
Payer: MEDICARE

## 2020-10-16 VITALS
HEIGHT: 67.48 IN | SYSTOLIC BLOOD PRESSURE: 120 MMHG | WEIGHT: 215 LBS | HEART RATE: 92 BPM | TEMPERATURE: 97 F | BODY MASS INDEX: 33.35 KG/M2 | DIASTOLIC BLOOD PRESSURE: 74 MMHG

## 2020-10-16 DIAGNOSIS — Z12.5 SCREENING PSA (PROSTATE SPECIFIC ANTIGEN): ICD-10-CM

## 2020-10-16 DIAGNOSIS — I87.2 VENOUS INSUFFICIENCY OF BOTH LOWER EXTREMITIES: ICD-10-CM

## 2020-10-16 DIAGNOSIS — M48.10 DISH (DIFFUSE IDIOPATHIC SKELETAL HYPEROSTOSIS): ICD-10-CM

## 2020-10-16 DIAGNOSIS — Z86.69 HISTORY OF OBSTRUCTIVE SLEEP APNEA: ICD-10-CM

## 2020-10-16 DIAGNOSIS — R35.0 URINARY FREQUENCY: ICD-10-CM

## 2020-10-16 DIAGNOSIS — J45.20 MILD INTERMITTENT ASTHMA WITHOUT COMPLICATION: ICD-10-CM

## 2020-10-16 DIAGNOSIS — I77.810 DILATION OF THORACIC AORTA (HCC): ICD-10-CM

## 2020-10-16 DIAGNOSIS — R93.1 ABNORMAL SCREENING CT OF HEART: ICD-10-CM

## 2020-10-16 DIAGNOSIS — I25.83 CORONARY ATHEROSCLEROSIS DUE TO LIPID RICH PLAQUE: ICD-10-CM

## 2020-10-16 DIAGNOSIS — M25.552 HIP PAIN, LEFT: ICD-10-CM

## 2020-10-16 DIAGNOSIS — Z12.5 SCREENING FOR PROSTATE CANCER: ICD-10-CM

## 2020-10-16 DIAGNOSIS — Z97.4 WEARS HEARING AID: ICD-10-CM

## 2020-10-16 DIAGNOSIS — Z86.010 PERSONAL HISTORY OF COLONIC POLYPS: ICD-10-CM

## 2020-10-16 DIAGNOSIS — Z00.00 ENCOUNTER FOR ANNUAL HEALTH EXAMINATION: Primary | ICD-10-CM

## 2020-10-16 DIAGNOSIS — R80.9 CONTROLLED TYPE 2 DIABETES MELLITUS WITH MICROALBUMINURIA, WITHOUT LONG-TERM CURRENT USE OF INSULIN (HCC): ICD-10-CM

## 2020-10-16 DIAGNOSIS — Z23 FLU VACCINE NEED: ICD-10-CM

## 2020-10-16 DIAGNOSIS — I25.10 CORONARY ATHEROSCLEROSIS DUE TO LIPID RICH PLAQUE: ICD-10-CM

## 2020-10-16 DIAGNOSIS — H90.3 SENSORY HEARING LOSS, BILATERAL: ICD-10-CM

## 2020-10-16 DIAGNOSIS — I65.23 BILATERAL CAROTID ARTERY STENOSIS: ICD-10-CM

## 2020-10-16 DIAGNOSIS — D22.9 MULTIPLE PIGMENTED NEVI: ICD-10-CM

## 2020-10-16 DIAGNOSIS — I10 ESSENTIAL HYPERTENSION: ICD-10-CM

## 2020-10-16 DIAGNOSIS — I77.810 AORTIC ECTASIA, THORACIC (HCC): ICD-10-CM

## 2020-10-16 DIAGNOSIS — R06.83 SNORING: ICD-10-CM

## 2020-10-16 DIAGNOSIS — E78.5 HYPERLIPIDEMIA LDL GOAL <100: ICD-10-CM

## 2020-10-16 DIAGNOSIS — I51.7 LVH (LEFT VENTRICULAR HYPERTROPHY): ICD-10-CM

## 2020-10-16 DIAGNOSIS — E11.29 CONTROLLED TYPE 2 DIABETES MELLITUS WITH MICROALBUMINURIA, WITHOUT LONG-TERM CURRENT USE OF INSULIN (HCC): ICD-10-CM

## 2020-10-16 DIAGNOSIS — Z13.0 SCREENING FOR DEFICIENCY ANEMIA: ICD-10-CM

## 2020-10-16 DIAGNOSIS — E66.9 OBESITY (BMI 30.0-34.9): ICD-10-CM

## 2020-10-16 PROBLEM — E11.65 TYPE 2 DIABETES MELLITUS WITH HYPERGLYCEMIA, WITHOUT LONG-TERM CURRENT USE OF INSULIN (HCC): Status: ACTIVE | Noted: 2020-10-16

## 2020-10-16 PROCEDURE — 99214 OFFICE O/P EST MOD 30 MIN: CPT | Performed by: FAMILY MEDICINE

## 2020-10-16 PROCEDURE — G0008 ADMIN INFLUENZA VIRUS VAC: HCPCS | Performed by: FAMILY MEDICINE

## 2020-10-16 PROCEDURE — 90662 IIV NO PRSV INCREASED AG IM: CPT | Performed by: FAMILY MEDICINE

## 2020-10-16 PROCEDURE — G0439 PPPS, SUBSEQ VISIT: HCPCS | Performed by: FAMILY MEDICINE

## 2020-10-16 RX ORDER — ALBUTEROL SULFATE 90 UG/1
2 AEROSOL, METERED RESPIRATORY (INHALATION)
Qty: 1 INHALER | Refills: 0 | Status: SHIPPED | OUTPATIENT
Start: 2020-10-16 | End: 2021-10-18

## 2020-10-16 NOTE — PROGRESS NOTES
HPI:   Keo Mccormack is a 69year old male who presents for a Medicare Subsequent Annual Wellness visit (Pt already had Initial Annual Wellness).   10/6/2020 hemoglobin A1c 6.5, GFR 61, LDL 65, urine microalbumin slightly elevated 77.2.    11/27/2019 C in 12 months is    recommended to document stability.   INCIDENTAL FINDING: Dilated Thoracic Aorta(__4.0-4.1__cm).  If indicated, recommend CT angiogram of thoracic aorta for further evaluation.    Dictated by: Love Wray MD on 4/05/2017   Type 2 diabe state he is willing to do it this year.   He feels he does not have apnea anymore  Family history colon cancer/ history of colon polyps  Colonoscopy done 10/31/2019 is due 10/31/2022 Dr. Renata Aguilar.    lung nodule less than 6 mm  CONCLUSION:  Scattered micro nodu (if present), and forms available to patient in AVS     He does NOT have a Power of  for [employer] on file in [facility].    Advance care planning including the explanation and discussion of advance directives standard forms performed Face to Face with Last Chemistry Labs:   Lab Results   Component Value Date    AST 14 (L) 10/06/2020    ALT 23 10/06/2020    CA 9.5 10/06/2020    ALB 3.6 10/06/2020    TSH 3.030 09/03/2019    CREATSERUM 1.19 10/06/2020    GLU 80 10/06/2020        CBC  (most recent labs) that includes tonsillectomy; wrist fracture surgery (Left, 2497-0898); hernia surgery (9763-1236); colonoscopy (2017); and colonoscopy.     His family history includes Cancer in his father; Gracy Rajas in his father; Colon Problems in his father; Hypertens Nose:  Deferred secondary to COVID-19 testing.    Throat:    Neck: Supple, symmetrical, trachea midline, no adenopathy, thyroid: not enlarged, symmetric, no tenderness/mass/nodules, no carotid bruit or JVD   Back:   Symmetric, no curvature, ROM normal, no to follow-up with cardiologist overdue. aortic ectasia, thoracic (HCC)  Dilation of thoracic aorta (HCC)  -     CARDIO - INTERNAL  -     OP REFERRAL TO DIAGNOSTIC SLEEP STUDY  -     GENERAL SLEEP STUDY TRANSCRIPTION;  Future  Abnormal screening CT of heart packaged/processed. Mild intermittent asthma without complication  -     Albuterol Sulfate HFA (PROAIR HFA) 108 (90 Base) MCG/ACT Inhalation Aero Soln; Inhale 2 puffs into the lungs every 4 to 6 hours as needed for Wheezing.   Continue with Advair once found.     Fasting Blood Sugar (FSB)Annually Glucose (mg/dL)   Date Value   10/06/2020 80       Cardiovascular Disease Screening     LDL Annually LDL Cholesterol (mg/dL)   Date Value   10/06/2020 65        EKG - w/ Initial Preventative Physical Exam only, o prescription benefits      SPECIFIC DISEASE MONITORING Internal Lab or Procedure External Lab or Procedure      Annual Monitoring of Persistent     Medications (ACE/ARB, digoxin diuretics, anticonvulsants.)    Potassium  Annually Potassium (mmol/L)   Date

## 2020-10-16 NOTE — PATIENT INSTRUCTIONS
Routine Healthcare for Men   Routine checkups can find treatable problems early. For many medical problems, early treatment can help prevent more serious complications. The value of checkups and how often you have them depend mainly on your age.  Your perso controversial. Many studies have been done, but they do not yet show that it is practical to do it on all men at their checkups. The test often gives misleading results and can cause undue anxiety, expense, and unnecessary medical procedures.  You should di whooping cough (pertussis) as well as tetanus. If you are 72 or older, this new vaccine has not yet been approved for your age group.  Because babies are most susceptible to complications from whooping cough, Tdap is especially recommended for adults caring vegetables in your diet. Get regular physical activity or exercise. Injury prevention: Use lap and shoulder belts when you drive. Use a helmet when you ride a motorcycle or bicycle.  If you are around guns or other firearms, practice safe handling and laura yrs including Total, LDL and Trigs LDL Cholesterol (mg/dL)   Date Value   10/06/2020 65     Cholesterol, Total (mg/dL)   Date Value   10/06/2020 134   11/27/2019 141     Triglycerides (mg/dL)   Date Value   10/06/2020 56   11/27/2019 69        EKG - covere • FLU VACC HIGH DOSE PRSV FREE   Orders placed or performed in visit on 09/09/19   • FLU VACC HIGH DOSE PRSV FREE    Please get every year    Pneumococcal 13 (Prevnar)  Covered Once after 65 Orders placed or performed in visit on 02/23/17   • Shannan Cruz regarding Advance Directives.

## 2020-10-17 NOTE — H&P
[facility]    History and Physical    Kerri Session Patient Status:  No patient class for patient encounter    1945 MRN [id number]   Location [address] [place] [address] Attending No att. provid Signs: Blood pressure 120/74, pulse 92, temperature 97.1 °F (36.2 °C), temperature source Skin, height 67.48\", weight 215 lb (97.5 kg).   Physical Exam      Results:     Lab Results   Component Value Date    WBC 6.9 11/27/2019    HGB 16.1 11/27/2019    HC

## 2020-10-19 ENCOUNTER — TELEPHONE (OUTPATIENT)
Dept: FAMILY MEDICINE CLINIC | Facility: CLINIC | Age: 75
End: 2020-10-19

## 2020-10-19 NOTE — TELEPHONE ENCOUNTER
Fax received for prior auth for ALBUTEROL. Per covermymeds:    Drug is covered by current benefit plan.  No further PA activity needed

## 2020-11-19 ENCOUNTER — HOSPITAL ENCOUNTER (OUTPATIENT)
Dept: GENERAL RADIOLOGY | Age: 75
Discharge: HOME | End: 2020-11-19
Attending: FAMILY MEDICINE
Payer: MEDICARE

## 2020-11-19 DIAGNOSIS — M25.552 HIP PAIN, LEFT: ICD-10-CM

## 2020-11-19 PROCEDURE — 73502 X-RAY EXAM HIP UNI 2-3 VIEWS: CPT | Performed by: FAMILY MEDICINE

## 2020-11-19 NOTE — PROGRESS NOTES
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 Try Tylenol as needed for pain.   Sent to my chart

## 2020-12-04 RX ORDER — BLOOD SUGAR DIAGNOSTIC
STRIP MISCELLANEOUS
Qty: 100 STRIP | Refills: 5 | Status: SHIPPED | OUTPATIENT
Start: 2020-12-04 | End: 2021-12-20

## 2020-12-04 RX ORDER — LANCETS 33 GAUGE
EACH MISCELLANEOUS
Qty: 100 EACH | Refills: 0 | Status: SHIPPED | OUTPATIENT
Start: 2020-12-04 | End: 2021-01-27

## 2020-12-04 NOTE — TELEPHONE ENCOUNTER
Requested Prescriptions     Signed Prescriptions Disp Refills   • ONETOUCH ULTRA In Vitro Strip 100 strip 5     Sig: TEST EVERY MORNING AND OCCASIONALLY 2 HOURS AFTER EATING     Authorizing Provider: Melani Guevara     Ordering User: Alvaro Garcia   • O

## 2021-01-27 DIAGNOSIS — Z23 NEED FOR VACCINATION: ICD-10-CM

## 2021-01-27 DIAGNOSIS — R80.9 CONTROLLED TYPE 2 DIABETES MELLITUS WITH MICROALBUMINURIA, WITHOUT LONG-TERM CURRENT USE OF INSULIN (HCC): Primary | ICD-10-CM

## 2021-01-27 DIAGNOSIS — E11.29 CONTROLLED TYPE 2 DIABETES MELLITUS WITH MICROALBUMINURIA, WITHOUT LONG-TERM CURRENT USE OF INSULIN (HCC): Primary | ICD-10-CM

## 2021-01-27 RX ORDER — LANCETS 33 GAUGE
1 EACH MISCELLANEOUS AS DIRECTED
Qty: 200 EACH | Refills: 1 | Status: SHIPPED | OUTPATIENT
Start: 2021-01-27 | End: 2021-08-02

## 2021-02-02 ENCOUNTER — PATIENT MESSAGE (OUTPATIENT)
Dept: FAMILY MEDICINE CLINIC | Facility: CLINIC | Age: 76
End: 2021-02-02

## 2021-02-03 NOTE — TELEPHONE ENCOUNTER
From: Cate Hernandez  To: Christine Sanders PA-C  Sent: 2/2/2021 8:59 AM CST  Subject: Prescription Question    Dr. Shireen Muniz:  I always went to [facility] [address] and still intend.  When I left my previous employer the HR person said I could order

## 2021-02-24 ENCOUNTER — OFFICE VISIT (OUTPATIENT)
Dept: FAMILY MEDICINE CLINIC | Facility: CLINIC | Age: 76
End: 2021-02-24
Payer: MEDICARE

## 2021-02-24 VITALS
WEIGHT: 223 LBS | BODY MASS INDEX: 35 KG/M2 | HEIGHT: 67 IN | TEMPERATURE: 97 F | HEART RATE: 80 BPM | DIASTOLIC BLOOD PRESSURE: 70 MMHG | SYSTOLIC BLOOD PRESSURE: 138 MMHG

## 2021-02-24 DIAGNOSIS — E11.29 CONTROLLED TYPE 2 DIABETES MELLITUS WITH MICROALBUMINURIA, WITHOUT LONG-TERM CURRENT USE OF INSULIN (HCC): Primary | ICD-10-CM

## 2021-02-24 DIAGNOSIS — I65.23 BILATERAL CAROTID ARTERY STENOSIS: ICD-10-CM

## 2021-02-24 DIAGNOSIS — G57.92 NEUROPATHY OF LEFT FOOT: ICD-10-CM

## 2021-02-24 DIAGNOSIS — I51.7 LVH (LEFT VENTRICULAR HYPERTROPHY): ICD-10-CM

## 2021-02-24 DIAGNOSIS — I10 ESSENTIAL HYPERTENSION: ICD-10-CM

## 2021-02-24 DIAGNOSIS — I25.10 CORONARY ATHEROSCLEROSIS DUE TO LIPID RICH PLAQUE: ICD-10-CM

## 2021-02-24 DIAGNOSIS — I77.810 DILATION OF THORACIC AORTA (HCC): ICD-10-CM

## 2021-02-24 DIAGNOSIS — R80.9 CONTROLLED TYPE 2 DIABETES MELLITUS WITH MICROALBUMINURIA, WITHOUT LONG-TERM CURRENT USE OF INSULIN (HCC): Primary | ICD-10-CM

## 2021-02-24 DIAGNOSIS — I25.83 CORONARY ATHEROSCLEROSIS DUE TO LIPID RICH PLAQUE: ICD-10-CM

## 2021-02-24 DIAGNOSIS — L84 PRE-ULCERATIVE CORN OR CALLOUS: ICD-10-CM

## 2021-02-24 PROCEDURE — 99214 OFFICE O/P EST MOD 30 MIN: CPT | Performed by: FAMILY MEDICINE

## 2021-02-24 NOTE — PROGRESS NOTES
HPI:   Pallavi Luna is a 70year old male who presents for recheck of his diabetes. Patient’s FBS have been <130 due in April for hemoglobin A1c.   Hemoglobin A1c done 10/6/2020 6.5  Slight increase in the urine microalbumin creatinine ratio needs rep FHx early CAD  No cardiac testing ordered  FINDINGS: 11/12/2019 Ultrafast CT of the heart 566.48 calcium score distributed through the coronary arteries    THORACIC AORTA:  Thoracic aorta is mildly dilated at the mid ascending component measuring up to 4. 2 14 (L)   ALT (SGPT)      16 - 61 U/L  23   ALKALINE PHOSPHATASE      45 - 117 U/L  79   Total Bilirubin      0.1 - 2.0 mg/dL  0.7   TOTAL PROTEIN      6.4 - 8.2 g/dL  7.3   Albumin      3.4 - 5.0 g/dL  3.6   Globulin      2.8 - 4.4 g/dL  3.7   A/G Ratio Directed.  E11.29FASTING & 2 HOURS AFTER A MEAL 200 each 1   • ONETOUCH ULTRA In Vitro Strip TEST EVERY MORNING AND OCCASIONALLY 2 HOURS AFTER EATING 100 strip 5   • Blood Glucose Monitoring Suppl Does not apply Kit Take blood sugars in AM and occasionally without long-term current use of insulin ([facility] [address] 5/11/2018   • Wears glasses    • Wrist fracture, left 1999    shattered wrist from a fall      Past Surgical History:   Procedure Laterality Date   • COLONOSCOPY  2017    AMSURG   • COLONOSCOPY     • HERNIA DANIELS metatarsal and venous stasis.   Medial deviation of the bilateral fifth metatarsal.  Patient complains of sensation of rolled up sock in his foot when he is walking on left foot though monofilament exam is normal still consistent with neuropathy to the left or urinary issues. No history of thyroid cancer or pancreatitis.     2. Pre-ulcerative corn or callous  Removal of callus by podiatrist  - PODIATRY - INTERNAL    3. Neuropathy of left foot  As feeling that there is a bunched up sock on his left foot lino

## 2021-02-25 PROBLEM — G57.92 NEUROPATHY OF LEFT FOOT: Status: ACTIVE | Noted: 2021-02-25

## 2021-03-16 ENCOUNTER — TELEPHONE (OUTPATIENT)
Dept: FAMILY MEDICINE CLINIC | Facility: CLINIC | Age: 76
End: 2021-03-16

## 2021-03-16 NOTE — TELEPHONE ENCOUNTER
A Diabetic Reminder Letter and lab orders were sent to the patient's MyChart as well as to his home address.

## 2021-06-24 ENCOUNTER — PATIENT MESSAGE (OUTPATIENT)
Dept: FAMILY MEDICINE CLINIC | Facility: CLINIC | Age: 76
End: 2021-06-24

## 2021-07-06 ENCOUNTER — TELEPHONE (OUTPATIENT)
Dept: FAMILY MEDICINE CLINIC | Facility: CLINIC | Age: 76
End: 2021-07-06

## 2021-07-06 NOTE — TELEPHONE ENCOUNTER
The patient's completed \"Diabetic Detailed Written Order\" was successfully faxed to [facility] at [phone].

## 2021-07-15 ENCOUNTER — OFFICE VISIT (OUTPATIENT)
Dept: PODIATRY CLINIC | Facility: CLINIC | Age: 76
End: 2021-07-15
Payer: MEDICARE

## 2021-07-15 VITALS — HEIGHT: 69 IN | WEIGHT: 220 LBS | BODY MASS INDEX: 32.58 KG/M2

## 2021-07-15 DIAGNOSIS — Q66.70 PES CAVUS: ICD-10-CM

## 2021-07-15 DIAGNOSIS — M21.169 BOWLEGGED: ICD-10-CM

## 2021-07-15 DIAGNOSIS — R52 PAIN: Primary | ICD-10-CM

## 2021-07-15 PROCEDURE — 99203 OFFICE O/P NEW LOW 30 MIN: CPT | Performed by: PODIATRIST

## 2021-07-19 NOTE — PROGRESS NOTES
Liudmila Nixon is a 70year old male. Patient presents with:  Diabetic Foot Care: New pt Referred by PCP. Thinks he needs orthotics. Concerned with bow legs. Denies foot pain. Did not check blood sugar today.   Referral for A1C test  Saw PCP 2/2021 mouth daily. 90 tablet 0   • Montelukast Sodium 10 MG Oral Tab Take 1 tablet (10 mg total) by mouth daily.  90 tablet 0   • Blood Glucose Monitoring Suppl (ACURA BLOOD GLUCOSE METER) w/Device Does not apply Kit Substitute for any meter covered by plan check Used    Vaping Use      Vaping Use: Never used    Substance and Sexual Activity      Alcohol use:  Yes        Alcohol/week: 3.0 standard drinks        Types: 3 Cans of beer per week        Comment: ultralight      Drug use: Never    Other Topics      Concer asymptomatic in his feet and he is experiencing back discomfort I think he should start with his PCP and get a referral for may be an x-ray and/or an MRI to see if there is any problems there. But I do not think the orthotics are going to help him.   Retur

## 2021-07-31 DIAGNOSIS — E11.29 CONTROLLED TYPE 2 DIABETES MELLITUS WITH MICROALBUMINURIA, WITHOUT LONG-TERM CURRENT USE OF INSULIN (HCC): ICD-10-CM

## 2021-07-31 DIAGNOSIS — R80.9 CONTROLLED TYPE 2 DIABETES MELLITUS WITH MICROALBUMINURIA, WITHOUT LONG-TERM CURRENT USE OF INSULIN (HCC): ICD-10-CM

## 2021-08-02 RX ORDER — LANCETS 33 GAUGE
EACH MISCELLANEOUS
Qty: 200 EACH | Refills: 0 | Status: SHIPPED | OUTPATIENT
Start: 2021-08-02

## 2021-08-02 NOTE — TELEPHONE ENCOUNTER
Pt requesting refill of     Passed protocol, refill approved, sent to pharmacy. Last Office Visit with Provider: 2/24/21. Follow up in 6 months    No future appointments.   One last refill approved

## 2021-08-06 ENCOUNTER — TELEPHONE (OUTPATIENT)
Dept: FAMILY MEDICINE CLINIC | Facility: CLINIC | Age: 76
End: 2021-08-06

## 2021-08-06 NOTE — TELEPHONE ENCOUNTER
Received a fax from [facility] for Diabetic Detailed Written Order for glucose test strips and lancets. Form filled out. Placed in Arlene's basket for signature.

## 2021-08-25 ENCOUNTER — LAB ENCOUNTER (OUTPATIENT)
Dept: LAB | Age: 76
End: 2021-08-25
Attending: FAMILY MEDICINE
Payer: MEDICARE

## 2021-08-25 LAB
ALBUMIN SERPL-MCNC: 3.4 G/DL (ref 3.4–5)
ALBUMIN/GLOB SERPL: 1 {RATIO} (ref 1–2)
ALP LIVER SERPL-CCNC: 75 U/L
ALT SERPL-CCNC: 21 U/L
ANION GAP SERPL CALC-SCNC: 3 MMOL/L (ref 0–18)
AST SERPL-CCNC: 15 U/L (ref 15–37)
BASOPHILS # BLD AUTO: 0.05 X10(3) UL (ref 0–0.2)
BASOPHILS NFR BLD AUTO: 0.6 %
BILIRUB SERPL-MCNC: 0.8 MG/DL (ref 0.1–2)
BUN BLD-MCNC: 18 MG/DL (ref 7–18)
CALCIUM BLD-MCNC: 8.6 MG/DL (ref 8.5–10.1)
CHLORIDE SERPL-SCNC: 106 MMOL/L (ref 98–112)
CHOLEST SMN-MCNC: 137 MG/DL (ref ?–200)
CO2 SERPL-SCNC: 29 MMOL/L (ref 21–32)
CREAT BLD-MCNC: 1.08 MG/DL
CREAT UR-SCNC: 74.1 MG/DL
EOSINOPHIL # BLD AUTO: 0.33 X10(3) UL (ref 0–0.7)
EOSINOPHIL NFR BLD AUTO: 4.2 %
ERYTHROCYTE [DISTWIDTH] IN BLOOD BY AUTOMATED COUNT: 13.2 %
EST. AVERAGE GLUCOSE BLD GHB EST-MCNC: 169 MG/DL (ref 68–126)
GLOBULIN PLAS-MCNC: 3.5 G/DL (ref 2.8–4.4)
GLUCOSE BLD-MCNC: 112 MG/DL (ref 70–99)
HBA1C MFR BLD HPLC: 7.5 % (ref ?–5.7)
HCT VFR BLD AUTO: 51.1 %
HDLC SERPL-MCNC: 49 MG/DL (ref 40–59)
HGB BLD-MCNC: 16.6 G/DL
IMM GRANULOCYTES # BLD AUTO: 0.03 X10(3) UL (ref 0–1)
IMM GRANULOCYTES NFR BLD: 0.4 %
LDLC SERPL CALC-MCNC: 73 MG/DL (ref ?–100)
LYMPHOCYTES # BLD AUTO: 0.77 X10(3) UL (ref 1–4)
LYMPHOCYTES NFR BLD AUTO: 9.9 %
M PROTEIN MFR SERPL ELPH: 6.9 G/DL (ref 6.4–8.2)
MCH RBC QN AUTO: 29.7 PG (ref 26–34)
MCHC RBC AUTO-ENTMCNC: 32.5 G/DL (ref 31–37)
MCV RBC AUTO: 91.6 FL
MICROALBUMIN UR-MCNC: 2.84 MG/DL
MICROALBUMIN/CREAT 24H UR-RTO: 38.3 UG/MG (ref ?–30)
MONOCYTES # BLD AUTO: 0.79 X10(3) UL (ref 0.1–1)
MONOCYTES NFR BLD AUTO: 10.1 %
NEUTROPHILS # BLD AUTO: 5.83 X10 (3) UL (ref 1.5–7.7)
NEUTROPHILS # BLD AUTO: 5.83 X10(3) UL (ref 1.5–7.7)
NEUTROPHILS NFR BLD AUTO: 74.8 %
NONHDLC SERPL-MCNC: 88 MG/DL (ref ?–130)
OSMOLALITY SERPL CALC.SUM OF ELEC: 289 MOSM/KG (ref 275–295)
PATIENT FASTING Y/N/NP: YES
PATIENT FASTING Y/N/NP: YES
PLATELET # BLD AUTO: 251 10(3)UL (ref 150–450)
POTASSIUM SERPL-SCNC: 4.2 MMOL/L (ref 3.5–5.1)
PSA SERPL-MCNC: 0.69 NG/ML (ref ?–4)
RBC # BLD AUTO: 5.58 X10(6)UL
SODIUM SERPL-SCNC: 138 MMOL/L (ref 136–145)
TRIGL SERPL-MCNC: 73 MG/DL (ref 30–149)
TSI SER-ACNC: 2.91 MIU/ML (ref 0.36–3.74)
VLDLC SERPL CALC-MCNC: 11 MG/DL (ref 0–30)
WBC # BLD AUTO: 7.8 X10(3) UL (ref 4–11)

## 2021-08-25 PROCEDURE — 85025 COMPLETE CBC W/AUTO DIFF WBC: CPT | Performed by: FAMILY MEDICINE

## 2021-08-25 PROCEDURE — 36415 COLL VENOUS BLD VENIPUNCTURE: CPT | Performed by: FAMILY MEDICINE

## 2021-08-25 PROCEDURE — 84443 ASSAY THYROID STIM HORMONE: CPT | Performed by: FAMILY MEDICINE

## 2021-08-25 PROCEDURE — 80061 LIPID PANEL: CPT | Performed by: FAMILY MEDICINE

## 2021-08-25 PROCEDURE — 83036 HEMOGLOBIN GLYCOSYLATED A1C: CPT | Performed by: FAMILY MEDICINE

## 2021-08-25 PROCEDURE — 82043 UR ALBUMIN QUANTITATIVE: CPT | Performed by: FAMILY MEDICINE

## 2021-08-25 PROCEDURE — 82570 ASSAY OF URINE CREATININE: CPT | Performed by: FAMILY MEDICINE

## 2021-08-25 PROCEDURE — 80053 COMPREHEN METABOLIC PANEL: CPT | Performed by: FAMILY MEDICINE

## 2021-08-25 PROCEDURE — 84153 ASSAY OF PSA TOTAL: CPT | Performed by: FAMILY MEDICINE

## 2021-08-26 NOTE — PROGRESS NOTES
Follow-up to discuss lab results on 09/15/2021 at 7:00 AM hemoglobin A1c did increase. Urine microalbumin is better.   Kidney function function and complete blood count are normal.  Lipid, thyroid, complete blood count and prostate blood test normal.

## 2021-09-01 ENCOUNTER — TELEPHONE (OUTPATIENT)
Dept: FAMILY MEDICINE CLINIC | Facility: CLINIC | Age: 76
End: 2021-09-01

## 2021-09-15 ENCOUNTER — OFFICE VISIT (OUTPATIENT)
Dept: FAMILY MEDICINE CLINIC | Facility: CLINIC | Age: 76
End: 2021-09-15
Payer: MEDICARE

## 2021-09-15 VITALS
SYSTOLIC BLOOD PRESSURE: 150 MMHG | WEIGHT: 218 LBS | HEIGHT: 67 IN | RESPIRATION RATE: 24 BRPM | DIASTOLIC BLOOD PRESSURE: 82 MMHG | BODY MASS INDEX: 34.21 KG/M2 | HEART RATE: 76 BPM | TEMPERATURE: 98 F

## 2021-09-15 DIAGNOSIS — I77.810 DILATATION OF THORACIC AORTA (HCC): ICD-10-CM

## 2021-09-15 DIAGNOSIS — I77.810 AORTIC ECTASIA, THORACIC (HCC): ICD-10-CM

## 2021-09-15 DIAGNOSIS — E11.65 UNCONTROLLED TYPE 2 DIABETES MELLITUS WITH HYPERGLYCEMIA (HCC): Primary | ICD-10-CM

## 2021-09-15 DIAGNOSIS — I51.7 LVH (LEFT VENTRICULAR HYPERTROPHY): ICD-10-CM

## 2021-09-15 DIAGNOSIS — E78.5 HYPERLIPIDEMIA LDL GOAL <70: ICD-10-CM

## 2021-09-15 DIAGNOSIS — J45.20 MILD INTERMITTENT ASTHMA WITHOUT COMPLICATION: ICD-10-CM

## 2021-09-15 DIAGNOSIS — E66.9 OBESITY (BMI 30.0-34.9): ICD-10-CM

## 2021-09-15 DIAGNOSIS — I10 ESSENTIAL HYPERTENSION: ICD-10-CM

## 2021-09-15 DIAGNOSIS — R80.9 POSITIVE FOR MICROALBUMINURIA: ICD-10-CM

## 2021-09-15 PROCEDURE — 99215 OFFICE O/P EST HI 40 MIN: CPT | Performed by: FAMILY MEDICINE

## 2021-09-15 RX ORDER — LISINOPRIL 30 MG/1
30 TABLET ORAL DAILY
Qty: 90 TABLET | Refills: 1 | Status: SHIPPED | OUTPATIENT
Start: 2021-09-15

## 2021-09-15 RX ORDER — ROSUVASTATIN CALCIUM 40 MG/1
40 TABLET, COATED ORAL NIGHTLY
Qty: 90 TABLET | Refills: 1 | Status: SHIPPED | OUTPATIENT
Start: 2021-09-15

## 2021-09-15 RX ORDER — LISINOPRIL 20 MG/1
20 TABLET ORAL DAILY
Qty: 90 TABLET | Refills: 1 | Status: SHIPPED | OUTPATIENT
Start: 2021-09-15 | End: 2021-09-15 | Stop reason: DRUGHIGH

## 2021-09-15 RX ORDER — GLIPIZIDE 5 MG/1
5 TABLET ORAL
Qty: 90 TABLET | Refills: 1 | Status: SHIPPED | OUTPATIENT
Start: 2021-09-15 | End: 2021-12-20

## 2021-09-15 RX ORDER — MONTELUKAST SODIUM 10 MG/1
10 TABLET ORAL DAILY
Qty: 90 TABLET | Refills: 1 | Status: SHIPPED | OUTPATIENT
Start: 2021-09-15

## 2021-09-15 RX ORDER — EMPAGLIFLOZIN 25 MG/1
1 TABLET, FILM COATED ORAL
Qty: 90 TABLET | Refills: 1 | Status: SHIPPED | OUTPATIENT
Start: 2021-09-15

## 2021-09-15 RX ORDER — ZOSTER VACCINE RECOMBINANT, ADJUVANTED 50 MCG/0.5
50 KIT INTRAMUSCULAR ONCE
Qty: 1 EACH | Refills: 1 | Status: SHIPPED | OUTPATIENT
Start: 2021-09-15 | End: 2021-09-15

## 2021-09-15 NOTE — PROGRESS NOTES
HPI:   Pallavi Luna is a 70year old male who presents for recheck of his diabetes.     Patient’s FBS have been <130 hemoglobin A1c 7.5 was on 10/6/2020 @ 6.5 no hypoglycemia  Blood sugars do vary between 110 230 in the morning 2 hours after eating 140- stockings. Did not like RX compression stockings  No swelling in the handsand no symptoms of PAD. Multiple Cv risk factors. Had calcium score of 289 in 4/17. Mild dilatation of aorta, 40 mm. He is active with walking and some resistance machines.   Pa RBC      3.80 - 5.80 x10(6)uL  5.58   Hemoglobin      13.0 - 17.5 g/dL  16.6   Hematocrit      39.0 - 53.0 %  51.1   Platelet Count      685.7 - 450.0 10(3)uL  251.0   MCV      80.0 - 100.0 fL  91.6   MCH      26.0 - 34.0 pg  29.7   MCHC      31.0 - 37. 0 RANDOM      mg/dL  74.10   MALB/CRE CALC      <=30.0 ug/mg  38.3 (H)   HEMOGLOBIN A1c      <5.7 %  7.5 (H)   ESTIMATED AVERAGE GLUCOSE      68 - 126 mg/dL  169 (H)   TSH      0.358 - 3.740 mIU/mL  2.910   PSA      <=4.00 ng/mL  0.69             *    Immuni mg total) by mouth daily. 90 tablet 1   • ONETOUCH DELICA PLUS [id number] Does not apply Misc TEST FASTING AND 2 HOURS AFTER A MEAL AS DIRECTED 200 each 0   • Cholecalciferol (VITAMIN D3 OR) Take 1 capsule by mouth every other day.      • ONETOUCH ULTRA In V Smoking status: Never Smoker      Smokeless tobacco: Never Used    Vaping Use      Vaping Use: Never used    Alcohol use:  Yes      Alcohol/week: 3.0 standard drinks      Types: 3 Cans of beer per week      Comment: ultralight, 1-2 beers every 2-3 days    D asthma without complication  Lvh (left ventricular hypertrophy)  Aortic ectasia, thoracic (hcc)  Dilatation of thoracic aorta (hcc)    Orders Placed This Encounter      Hemoglobin A1C [E]      Microalb/Creat Ratio, Random Urine [E]      Meds & Refills for (JANUVIA) 100 MG Oral Tab; Take 1 tablet (100 mg total) by mouth daily. Dispense: 90 tablet; Refill: 1  - glipiZIDE 5 MG Oral Tab; Take 1 tablet (5 mg total) by mouth 2 (two) times daily before meals. Dispense: 90 tablet;  Refill: 1  - metFORMIN HCl 1000 meds, check 3 months  HgbA1C,  urine microalbumin . Advised to lose weight if needed with carbohydrate controlled diet and exercise, refer to Ophthalmology yearly exam required, check feet daily.   Time spent was 40 minutes more than 50% was spent on couns

## 2021-09-16 PROBLEM — I77.810 DILATATION OF THORACIC AORTA: Status: ACTIVE | Noted: 2017-04-17

## 2021-09-16 PROBLEM — E78.5 HYPERLIPIDEMIA LDL GOAL <70: Status: ACTIVE | Noted: 2019-01-28

## 2021-09-16 PROBLEM — R80.9 POSITIVE FOR MICROALBUMINURIA: Status: ACTIVE | Noted: 2021-09-16

## 2021-09-16 PROBLEM — I77.810 DILATATION OF THORACIC AORTA (HCC): Status: ACTIVE | Noted: 2017-04-17

## 2021-10-18 ENCOUNTER — OFFICE VISIT (OUTPATIENT)
Dept: FAMILY MEDICINE CLINIC | Facility: CLINIC | Age: 76
End: 2021-10-18
Payer: MEDICARE

## 2021-10-18 VITALS
TEMPERATURE: 98 F | DIASTOLIC BLOOD PRESSURE: 60 MMHG | HEIGHT: 67 IN | HEART RATE: 60 BPM | SYSTOLIC BLOOD PRESSURE: 132 MMHG | WEIGHT: 212 LBS | BODY MASS INDEX: 33.27 KG/M2

## 2021-10-18 DIAGNOSIS — R63.0 DECREASED APPETITE: ICD-10-CM

## 2021-10-18 DIAGNOSIS — E11.65 UNCONTROLLED TYPE 2 DIABETES MELLITUS WITH HYPERGLYCEMIA (HCC): ICD-10-CM

## 2021-10-18 DIAGNOSIS — I25.83 CORONARY ATHEROSCLEROSIS DUE TO LIPID RICH PLAQUE: ICD-10-CM

## 2021-10-18 DIAGNOSIS — I25.10 CORONARY ATHEROSCLEROSIS DUE TO LIPID RICH PLAQUE: ICD-10-CM

## 2021-10-18 DIAGNOSIS — I87.2 VENOUS INSUFFICIENCY OF BOTH LOWER EXTREMITIES: ICD-10-CM

## 2021-10-18 DIAGNOSIS — I51.7 LVH (LEFT VENTRICULAR HYPERTROPHY): ICD-10-CM

## 2021-10-18 DIAGNOSIS — M48.10 DISH (DIFFUSE IDIOPATHIC SKELETAL HYPEROSTOSIS): ICD-10-CM

## 2021-10-18 DIAGNOSIS — J45.20 MILD INTERMITTENT ASTHMA WITHOUT COMPLICATION: ICD-10-CM

## 2021-10-18 DIAGNOSIS — Z23 NEED FOR VACCINATION: ICD-10-CM

## 2021-10-18 DIAGNOSIS — R06.83 SNORING: ICD-10-CM

## 2021-10-18 DIAGNOSIS — Z80.0 FAMILY HISTORY OF MALIGNANT NEOPLASM OF DIGESTIVE ORGAN: ICD-10-CM

## 2021-10-18 DIAGNOSIS — E66.9 OBESITY (BMI 30.0-34.9): ICD-10-CM

## 2021-10-18 DIAGNOSIS — E78.5 HYPERLIPIDEMIA LDL GOAL <70: ICD-10-CM

## 2021-10-18 DIAGNOSIS — R93.1 ABNORMAL SCREENING CT OF HEART: ICD-10-CM

## 2021-10-18 DIAGNOSIS — R63.4 RECENT UNEXPLAINED WEIGHT LOSS: ICD-10-CM

## 2021-10-18 DIAGNOSIS — I77.810 DILATATION OF THORACIC AORTA (HCC): ICD-10-CM

## 2021-10-18 DIAGNOSIS — I49.3 PVC (PREMATURE VENTRICULAR CONTRACTION): ICD-10-CM

## 2021-10-18 DIAGNOSIS — Z97.4 WEARS HEARING AID: ICD-10-CM

## 2021-10-18 DIAGNOSIS — Z86.69 HISTORY OF OBSTRUCTIVE SLEEP APNEA: ICD-10-CM

## 2021-10-18 DIAGNOSIS — D22.9 MULTIPLE PIGMENTED NEVI: ICD-10-CM

## 2021-10-18 DIAGNOSIS — I65.23 BILATERAL CAROTID ARTERY STENOSIS: ICD-10-CM

## 2021-10-18 DIAGNOSIS — Z86.010 PERSONAL HISTORY OF COLONIC POLYPS: ICD-10-CM

## 2021-10-18 DIAGNOSIS — I77.810 AORTIC ECTASIA, THORACIC (HCC): ICD-10-CM

## 2021-10-18 DIAGNOSIS — I10 ESSENTIAL HYPERTENSION: ICD-10-CM

## 2021-10-18 DIAGNOSIS — H90.3 SENSORY HEARING LOSS, BILATERAL: ICD-10-CM

## 2021-10-18 DIAGNOSIS — I49.9 IRREGULAR HEART BEAT: ICD-10-CM

## 2021-10-18 DIAGNOSIS — R80.9 POSITIVE FOR MICROALBUMINURIA: ICD-10-CM

## 2021-10-18 DIAGNOSIS — Z00.00 ENCOUNTER FOR ANNUAL HEALTH EXAMINATION: Primary | ICD-10-CM

## 2021-10-18 PROBLEM — G57.92 NEUROPATHY OF LEFT FOOT: Status: RESOLVED | Noted: 2021-02-25 | Resolved: 2021-10-18

## 2021-10-18 PROBLEM — M25.552 HIP PAIN, LEFT: Status: RESOLVED | Noted: 2020-10-16 | Resolved: 2021-10-18

## 2021-10-18 PROCEDURE — 99213 OFFICE O/P EST LOW 20 MIN: CPT | Performed by: FAMILY MEDICINE

## 2021-10-18 PROCEDURE — G0439 PPPS, SUBSEQ VISIT: HCPCS | Performed by: FAMILY MEDICINE

## 2021-10-18 PROCEDURE — 90662 IIV NO PRSV INCREASED AG IM: CPT | Performed by: FAMILY MEDICINE

## 2021-10-18 PROCEDURE — G0008 ADMIN INFLUENZA VIRUS VAC: HCPCS | Performed by: FAMILY MEDICINE

## 2021-10-18 PROCEDURE — 93000 ELECTROCARDIOGRAM COMPLETE: CPT | Performed by: FAMILY MEDICINE

## 2021-10-18 RX ORDER — ALBUTEROL SULFATE 90 UG/1
2 AEROSOL, METERED RESPIRATORY (INHALATION)
Qty: 1 EACH | Refills: 0 | Status: SHIPPED | OUTPATIENT
Start: 2021-10-18

## 2021-10-18 RX ORDER — FLUTICASONE PROPIONATE AND SALMETEROL 250; 50 UG/1; UG/1
1 POWDER RESPIRATORY (INHALATION) EVERY MORNING
Qty: 1 EACH | Refills: 3 | Status: SHIPPED | OUTPATIENT
Start: 2021-10-18

## 2021-10-18 RX ORDER — LISINOPRIL 20 MG/1
1 TABLET ORAL DAILY
COMMUNITY
Start: 2021-09-15 | End: 2021-10-18 | Stop reason: DRUGHIGH

## 2021-10-18 NOTE — PROGRESS NOTES
HPI:   Williams Valentin is a 70year old male who presents for a Medicare Subsequent Annual Wellness visit (Pt already had Initial Annual Wellness).   COVID booster will be due in November  Will get shingles vaccine at pharmacy  Flu shot today  Colonoscop follow-up with Dr. Maribel Loredo on 1/26/2021. No cardiac orders were placed  He is getting a 2D echocardiogram next week.     FINDINGS: 11/12/2019 Ultrafast CT of the heart 566.48 calcium score distributed through the coronary arteries  THORACIC AORTA:  Jb Jon breath.   Denies any GERD symptoms does state he eats daily but just has less of an appetite than normal.  DISH (diffuse idiopathic skeletal hyperostosis)  Seen on prior imaging occasional back pain not affecting his ability to do things    Sensory hearing 1.00 - 4.00 x10(3) uL  0.77 (L)   Monocytes Absolute      0.10 - 1.00 x10(3) uL  0.79   Eosinophils Absolute      0.00 - 0.70 x10(3) uL  0.33   Basophils Absolute      0.00 - 0.20 x10(3) uL  0.05   Immature Granulocyte Absolute      0.00 - 1.00 x10(3) uL Delmy Cortes has some abnormal functions as listed below:  He has Hearing problems based on screening of functional status. Hearing Problems?: Yes  He has Vision problems based on screening of functional status.    Vision Problems? : Yes       Depression Scree of obstructive sleep apnea     Wears hearing aid     Multiple pigmented nevi     Personal history of colonic polyps     Family history of malignant neoplasm of digestive organ     Uncontrolled type 2 diabetes mellitus with hyperglycemia ([address]     Aortic ec tablet (10 mg total) by mouth daily. lisinopril 30 MG Oral Tab, Take 1 tablet (30 mg total) by mouth daily.   ONETOUCH DELICA PLUS [id number] Does not apply Misc, TEST FASTING AND 2 HOURS AFTER A MEAL AS DIRECTED  Cholecalciferol (VITAMIN D3 OR), Take 1 cap lesions  EYES: denies blurred vision or double vision  HEENT: denies nasal congestion, sinus pain or ST hearing decreased uses hearing aids  LUNGS: denies shortness of breath with exertion  CARDIOVASCULAR: denies chest pain on exertion  GI: denies abdomina several times during auscultation otherwise regular rate and rhythm, S1, S2 normal, no murmur, rub or gallop   Abdomen:   Soft, non-tender, bowel sounds active all four quadrants,  no masses, no organomegaly   Genitalia:  Deferred   Rectal:  Deferred   Ext with Advair and albuterol as needed  Continue with Singulair and over-the-counter antihistamine  Recent unexplained weight loss  Decreased appetite  -     CT ABDOMEN+PELVIS(CONTRAST ONLY)(CPT=74177);  Future  Historically looked at weights over the last sev day  Continue with Dhara Campos has not had any history of pancreatitis or groin issues.   Continue with the increase in lisinopril from 20 to 30 mg secondary to mild urine microalbumin creatinine ratio increase blood pressure now normal.  Rechecking hemoglobi (HDL)  Triglycerides Covered every 5 years for all Medicare beneficiaries without apparent signs or symptoms of cardiovascular disease Lab Results   Component Value Date    CHOLEST 137 08/25/2021    HDL 49 08/25/2021    LDL 73 08/25/2021    LDLD 71 11/27/2 Never done       Diabetes      Hemoglobin A1C Annually; if last result is elevated, may be asked to retest more frequently.     Medicare covers every 3 months Lab Results   Component Value Date     (H) 08/25/2021    A1C 7.5 (H) 08/25/2021       No re

## 2021-10-18 NOTE — PATIENT INSTRUCTIONS
Routine Healthcare for Men   Routine checkups can find treatable problems early. For many medical problems, early treatment can help prevent more serious complications. The value of checkups and how often you have them depend mainly on your age.  Your perso controversial. Many studies have been done, but they do not yet show that it is practical to do it on all men at their checkups. The test often gives misleading results and can cause undue anxiety, expense, and unnecessary medical procedures.  You should di whooping cough (pertussis) as well as tetanus. If you are 72 or older, this new vaccine has not yet been approved for your age group.  Because babies are most susceptible to complications from whooping cough, Tdap is especially recommended for adults caring vegetables in your diet. Get regular physical activity or exercise. Injury prevention: Use lap and shoulder belts when you drive. Use a helmet when you ride a motorcycle or bicycle.  If you are around guns or other firearms, practice safe handling and laura Abdominal Aortic Aneurysm (AAA) Covered once in a lifetime for one of the following risk factors   • Men who are 73-68 years old and have ever smoked   • Anyone with a family history 12/31/2019     Colorectal Cancer Screening  Covered for ages 52-80; only 08/25/2021       Dilated Eye Exam Annually 08/27/2021       Annual Monitoring of Persistent Medications (ACE/ARB, digoxin diuretics, anticonvulsants)    Potassium Annually Lab Results   Component Value Date    K 4.2 08/25/2021         Creatinine   Annually

## 2021-10-19 PROBLEM — I49.3 PVC (PREMATURE VENTRICULAR CONTRACTION): Status: ACTIVE | Noted: 2021-10-19

## 2021-10-19 PROBLEM — R63.0 DECREASED APPETITE: Status: ACTIVE | Noted: 2021-10-19

## 2021-10-19 PROBLEM — R63.4 RECENT UNEXPLAINED WEIGHT LOSS: Status: ACTIVE | Noted: 2021-10-19

## 2021-10-20 ENCOUNTER — TELEPHONE (OUTPATIENT)
Dept: FAMILY MEDICINE CLINIC | Facility: CLINIC | Age: 76
End: 2021-10-20

## 2021-10-20 NOTE — TELEPHONE ENCOUNTER
Per Viktoria Schmidt PA-C, the patient' EKG was successfully faxed to his cardiologist, Jose F Glass. Danielle May M.D., at [phone] to review.

## 2021-10-29 ENCOUNTER — HOSPITAL ENCOUNTER (OUTPATIENT)
Dept: CV DIAGNOSTICS | Age: 76
Discharge: HOME | End: 2021-10-29
Attending: FAMILY MEDICINE
Payer: MEDICARE

## 2021-10-29 DIAGNOSIS — I77.810 DILATATION OF THORACIC AORTA (HCC): ICD-10-CM

## 2021-10-29 DIAGNOSIS — I77.810 AORTIC ECTASIA, THORACIC (HCC): ICD-10-CM

## 2021-10-29 DIAGNOSIS — I10 ESSENTIAL HYPERTENSION: ICD-10-CM

## 2021-10-29 DIAGNOSIS — I51.7 LVH (LEFT VENTRICULAR HYPERTROPHY): ICD-10-CM

## 2021-10-29 PROCEDURE — 93306 TTE W/DOPPLER COMPLETE: CPT | Performed by: FAMILY MEDICINE

## 2021-10-30 NOTE — PROGRESS NOTES
Normal ejection fraction and no signs of pulmonary hypertension. Mitral valve mild calcification otherwise normal.  Aortic root normal size.   Overall a pretty good looking echocardiogram.

## 2021-11-10 ENCOUNTER — HOSPITAL ENCOUNTER (OUTPATIENT)
Dept: CT IMAGING | Age: 76
Discharge: HOME | End: 2021-11-10
Attending: FAMILY MEDICINE
Payer: MEDICARE

## 2021-11-10 DIAGNOSIS — R63.0 DECREASED APPETITE: ICD-10-CM

## 2021-11-10 DIAGNOSIS — R63.4 RECENT UNEXPLAINED WEIGHT LOSS: ICD-10-CM

## 2021-11-10 PROCEDURE — 74177 CT ABD & PELVIS W/CONTRAST: CPT | Performed by: FAMILY MEDICINE

## 2021-11-10 PROCEDURE — 82565 ASSAY OF CREATININE: CPT

## 2021-11-11 ENCOUNTER — PATIENT MESSAGE (OUTPATIENT)
Dept: FAMILY MEDICINE CLINIC | Facility: CLINIC | Age: 76
End: 2021-11-11

## 2021-11-11 ENCOUNTER — TELEPHONE (OUTPATIENT)
Dept: FAMILY MEDICINE CLINIC | Facility: CLINIC | Age: 76
End: 2021-11-11

## 2021-11-11 DIAGNOSIS — R63.4 RECENT UNEXPLAINED WEIGHT LOSS: Primary | ICD-10-CM

## 2021-11-11 DIAGNOSIS — R63.0 DECREASED APPETITE: ICD-10-CM

## 2021-11-11 NOTE — PROGRESS NOTES
Refer to gastroenterology there is some mild thickening in the duodenum could be from the contrast imaging but evaluation with gastroenterologist would be beneficial.  Forward referral information to patient  Order future tests/medications sent to my chart

## 2021-11-11 NOTE — TELEPHONE ENCOUNTER
Fifi Stone PA-C   11/11/2021  5:39 AM CST       Refer to gastroenterology there is some mild thickening in the duodenum could be from the contrast imaging but evaluation with gastroenterologist would be beneficial.  Forward referral information to violet

## 2021-11-11 NOTE — TELEPHONE ENCOUNTER
Referral placed and mychart message sent.    _______________________________________________________  Referred to Provider Information:  Provider Address Phone   Keisha Sol, [address], [place] [facility] [phone]

## 2021-11-11 NOTE — TELEPHONE ENCOUNTER
----- Message from Rhea Saavedra PA-C sent at 11/11/2021  5:39 AM CST -----  Refer to gastroenterology there is some mild thickening in the duodenum could be from the contrast imaging but evaluation with gastroenterologist would be beneficial.  Forward

## 2021-11-12 ENCOUNTER — PATIENT MESSAGE (OUTPATIENT)
Dept: FAMILY MEDICINE CLINIC | Facility: CLINIC | Age: 76
End: 2021-11-12

## 2021-12-09 ENCOUNTER — LAB ENCOUNTER (OUTPATIENT)
Dept: LAB | Age: 76
End: 2021-12-09
Attending: FAMILY MEDICINE
Payer: MEDICARE

## 2021-12-09 DIAGNOSIS — E11.65 UNCONTROLLED TYPE 2 DIABETES MELLITUS WITH HYPERGLYCEMIA (HCC): ICD-10-CM

## 2021-12-09 PROCEDURE — 82043 UR ALBUMIN QUANTITATIVE: CPT

## 2021-12-09 PROCEDURE — 36415 COLL VENOUS BLD VENIPUNCTURE: CPT

## 2021-12-09 PROCEDURE — 82570 ASSAY OF URINE CREATININE: CPT

## 2021-12-09 PROCEDURE — 83036 HEMOGLOBIN GLYCOSYLATED A1C: CPT

## 2021-12-10 DIAGNOSIS — E11.65 UNCONTROLLED TYPE 2 DIABETES MELLITUS WITH HYPERGLYCEMIA (HCC): Primary | ICD-10-CM

## 2021-12-10 NOTE — PROGRESS NOTES
The  hemoglobin A1c is improved at 6.9  repeat again in 6 months.   Stay on present medication  Order future tests/medications sent to my chart 12-Jul-2021 18:05

## 2021-12-13 ENCOUNTER — OFFICE VISIT (OUTPATIENT)
Dept: FAMILY MEDICINE CLINIC | Facility: CLINIC | Age: 76
End: 2021-12-13
Payer: MEDICARE

## 2021-12-13 VITALS
DIASTOLIC BLOOD PRESSURE: 80 MMHG | BODY MASS INDEX: 34.37 KG/M2 | SYSTOLIC BLOOD PRESSURE: 126 MMHG | HEIGHT: 67 IN | TEMPERATURE: 98 F | HEART RATE: 80 BPM | WEIGHT: 219 LBS

## 2021-12-13 DIAGNOSIS — E66.9 OBESITY (BMI 30.0-34.9): ICD-10-CM

## 2021-12-13 DIAGNOSIS — Z13.0 SCREENING FOR DEFICIENCY ANEMIA: ICD-10-CM

## 2021-12-13 DIAGNOSIS — I10 ESSENTIAL HYPERTENSION: ICD-10-CM

## 2021-12-13 DIAGNOSIS — E11.65 CONTROLLED TYPE 2 DIABETES MELLITUS WITH HYPERGLYCEMIA, WITHOUT LONG-TERM CURRENT USE OF INSULIN (HCC): Primary | ICD-10-CM

## 2021-12-13 DIAGNOSIS — E78.5 HYPERLIPIDEMIA LDL GOAL <70: ICD-10-CM

## 2021-12-13 PROCEDURE — 99214 OFFICE O/P EST MOD 30 MIN: CPT | Performed by: FAMILY MEDICINE

## 2021-12-14 NOTE — PROGRESS NOTES
HPI:   Ede Lawrence is a 78year old male who presents for recheck of his diabetes and weight loss unexpected  Patient had unexpected weight loss at last office visit and did do a CT of abdomen and pelvis no masses found.   Admits that he was stressed a without evidence of diverticulitis.     Dictated by (CST): Vitaliy Real MD on 11/10/2021              Immunization History  Administered            Date(s) Administered    Covid-19 Vaccine Pfizer 30 mcg/0.3 ml                          04/23/2021 05/17 (JARDIANCE) 25 MG Oral Tab Take 1 tablet by mouth once daily. 90 tablet 1   • montelukast 10 MG Oral Tab Take 1 tablet (10 mg total) by mouth daily. 90 tablet 1   • lisinopril 30 MG Oral Tab Take 1 tablet (30 mg total) by mouth daily.  90 tablet 1   • ONETO Smokeless tobacco: Never Used    Vaping Use      Vaping Use: Never used    Alcohol use:  Yes      Alcohol/week: 3.0 standard drinks      Types: 3 Cans of beer per week      Comment: ultralight, 1-2 beers every 2-3 days    Drug use: Never    Exercise: Penny Carmona Platelet      Lipid Panel [E]      Meds & Refills for this Visit:  Requested Prescriptions      No prescriptions requested or ordered in this encounter       Imaging & Consults:  None  1.  Controlled type 2 diabetes mellitus with hyperglycemia, without long

## 2021-12-20 DIAGNOSIS — E11.65 UNCONTROLLED TYPE 2 DIABETES MELLITUS WITH HYPERGLYCEMIA (HCC): Primary | ICD-10-CM

## 2021-12-20 RX ORDER — GLIPIZIDE 5 MG/1
TABLET ORAL
Qty: 90 TABLET | Refills: 2 | Status: SHIPPED | OUTPATIENT
Start: 2021-12-20

## 2021-12-20 RX ORDER — BLOOD SUGAR DIAGNOSTIC
STRIP MISCELLANEOUS
Qty: 100 STRIP | Refills: 5 | Status: SHIPPED | OUTPATIENT
Start: 2021-12-20

## 2021-12-20 NOTE — TELEPHONE ENCOUNTER
Refill Passed Protocol:     Pt requesting refill of one touch and glipizide     Refill was approved and sent to pharmacy:    Last Office Visit with Provider: 12/13/21    Appt. scheduled on 6/13/22

## 2022-02-01 ENCOUNTER — PATIENT MESSAGE (OUTPATIENT)
Dept: FAMILY MEDICINE CLINIC | Facility: CLINIC | Age: 77
End: 2022-02-01

## 2022-02-01 NOTE — TELEPHONE ENCOUNTER
From: Danna Mis  To: Jake Corona PA-C  Sent: 2/1/2022 11:07 AM CST  Subject: Heart Monitor    procrastination but I figured no news is good news. I saw Dr. Maya Rodriguez as you suggested. He asked I do a three day monitor. I lost track being I heard nothing. So apparently last week of Jan final analysis. Yes, I don't understand. Yes I would think someone would call one way or the other. Now there is a Dr. Star Decker. No nothing about him. I would still like Dr. Maya Rodriguez as we all would. If I can take a moment of your time. Did the report say Normal. Girl who put monitor on said someone would call either way. Stay well.  Thanks  Tatyana Morris

## 2022-02-04 PROBLEM — I47.10 PSVT (PAROXYSMAL SUPRAVENTRICULAR TACHYCARDIA) (HCC): Status: ACTIVE | Noted: 2022-02-04

## 2022-02-04 PROBLEM — I47.10 PSVT (PAROXYSMAL SUPRAVENTRICULAR TACHYCARDIA): Status: ACTIVE | Noted: 2022-02-04

## 2022-02-04 PROBLEM — I47.1 PSVT (PAROXYSMAL SUPRAVENTRICULAR TACHYCARDIA) (HCC): Status: ACTIVE | Noted: 2022-02-04

## 2022-03-21 RX ORDER — MONTELUKAST SODIUM 10 MG/1
TABLET ORAL
Qty: 90 TABLET | Refills: 1 | Status: SHIPPED | OUTPATIENT
Start: 2022-03-21

## 2022-03-21 RX ORDER — ROSUVASTATIN CALCIUM 40 MG/1
TABLET, COATED ORAL
Qty: 90 TABLET | Refills: 1 | Status: SHIPPED | OUTPATIENT
Start: 2022-03-21

## 2022-03-21 RX ORDER — EMPAGLIFLOZIN 25 MG/1
TABLET, FILM COATED ORAL
Qty: 90 TABLET | Refills: 1 | Status: SHIPPED | OUTPATIENT
Start: 2022-03-21

## 2022-03-21 RX ORDER — SITAGLIPTIN 100 MG/1
TABLET, FILM COATED ORAL
Qty: 90 TABLET | Refills: 1 | Status: SHIPPED | OUTPATIENT
Start: 2022-03-21

## 2022-03-22 NOTE — TELEPHONE ENCOUNTER
Refill Passed Protocol:     Refill was approved and sent to pharmacy:     Last Office Visit with Provider: 12/13/21    Appt. scheduled on 6/13/22

## 2022-03-27 RX ORDER — LISINOPRIL 30 MG/1
TABLET ORAL
Qty: 90 TABLET | Refills: 0 | Status: SHIPPED | OUTPATIENT
Start: 2022-03-27

## 2022-03-27 RX ORDER — GLIPIZIDE 5 MG/1
TABLET ORAL
Qty: 180 TABLET | Refills: 0 | Status: SHIPPED | OUTPATIENT
Start: 2022-03-27

## 2022-07-15 ENCOUNTER — PATIENT MESSAGE (OUTPATIENT)
Dept: FAMILY MEDICINE CLINIC | Facility: CLINIC | Age: 77
End: 2022-07-15

## 2022-07-15 NOTE — TELEPHONE ENCOUNTER
From: Theresa Rodriguez  To: Tommas Galeazzi, PA-C  Sent: 7/15/2022 8:36 AM CDT  Subject: response and question    I just saw a suggested appointment from a [address] I don't sign on and if there was a notice to do so I missed it; too much sports stuff. I have to get a blood draw and appointment. Mine was c ancelled by the office when I had one. Now. . I get a notice from Dr. Arie Diego. I have not called back as I had him end of year; end of year he gave me a patch to put on. Well he left . . . I liked him. Had a Peder Sleigh and met with her. No problem with her visit. Apparently heart was still ticking as should. Now reason for this. I get a phone number tocall from Dr. Arie Diego. I have not called as my idea is he still in the network or whatever they do with Dr. Jessi Khan. I don't know if a problem. Just checking.   Chetna Bailey

## 2022-07-25 ENCOUNTER — PATIENT MESSAGE (OUTPATIENT)
Dept: FAMILY MEDICINE CLINIC | Facility: CLINIC | Age: 77
End: 2022-07-25

## 2022-09-15 ENCOUNTER — LAB ENCOUNTER (OUTPATIENT)
Dept: LAB | Age: 77
End: 2022-09-15
Attending: FAMILY MEDICINE
Payer: MEDICARE

## 2022-09-15 DIAGNOSIS — E11.65 CONTROLLED TYPE 2 DIABETES MELLITUS WITH HYPERGLYCEMIA, WITHOUT LONG-TERM CURRENT USE OF INSULIN (HCC): ICD-10-CM

## 2022-09-15 DIAGNOSIS — E11.65 UNCONTROLLED TYPE 2 DIABETES MELLITUS WITH HYPERGLYCEMIA (HCC): ICD-10-CM

## 2022-09-15 DIAGNOSIS — E78.5 HYPERLIPIDEMIA LDL GOAL <70: ICD-10-CM

## 2022-09-15 DIAGNOSIS — Z13.0 SCREENING FOR DEFICIENCY ANEMIA: ICD-10-CM

## 2022-09-15 DIAGNOSIS — I10 ESSENTIAL HYPERTENSION: ICD-10-CM

## 2022-09-15 LAB
ALBUMIN SERPL-MCNC: 3.7 G/DL (ref 3.4–5)
ALBUMIN/GLOB SERPL: 1.2 {RATIO} (ref 1–2)
ALP LIVER SERPL-CCNC: 67 U/L
ALT SERPL-CCNC: 21 U/L
ANION GAP SERPL CALC-SCNC: 5 MMOL/L (ref 0–18)
AST SERPL-CCNC: 17 U/L (ref 15–37)
BASOPHILS # BLD AUTO: 0.05 X10(3) UL (ref 0–0.2)
BASOPHILS NFR BLD AUTO: 0.7 %
BILIRUB SERPL-MCNC: 0.8 MG/DL (ref 0.1–2)
BUN BLD-MCNC: 19 MG/DL (ref 7–18)
CALCIUM BLD-MCNC: 9.3 MG/DL (ref 8.5–10.1)
CHLORIDE SERPL-SCNC: 107 MMOL/L (ref 98–112)
CHOLEST SERPL-MCNC: 147 MG/DL (ref ?–200)
CO2 SERPL-SCNC: 27 MMOL/L (ref 21–32)
CREAT BLD-MCNC: 1.08 MG/DL
CREAT UR-SCNC: 28.2 MG/DL
EOSINOPHIL # BLD AUTO: 0.31 X10(3) UL (ref 0–0.7)
EOSINOPHIL NFR BLD AUTO: 4.4 %
ERYTHROCYTE [DISTWIDTH] IN BLOOD BY AUTOMATED COUNT: 13.8 %
EST. AVERAGE GLUCOSE BLD GHB EST-MCNC: 146 MG/DL (ref 68–126)
FASTING PATIENT LIPID ANSWER: YES
FASTING STATUS PATIENT QL REPORTED: YES
GFR SERPLBLD BASED ON 1.73 SQ M-ARVRAT: 72 ML/MIN/1.73M2 (ref 60–?)
GLOBULIN PLAS-MCNC: 3.2 G/DL (ref 2.8–4.4)
GLUCOSE BLD-MCNC: 91 MG/DL (ref 70–99)
HBA1C MFR BLD: 6.7 % (ref ?–5.7)
HCT VFR BLD AUTO: 51.6 %
HDLC SERPL-MCNC: 61 MG/DL (ref 40–59)
HGB BLD-MCNC: 16.1 G/DL
IMM GRANULOCYTES # BLD AUTO: 0.03 X10(3) UL (ref 0–1)
IMM GRANULOCYTES NFR BLD: 0.4 %
LDLC SERPL CALC-MCNC: 76 MG/DL (ref ?–100)
LYMPHOCYTES # BLD AUTO: 0.64 X10(3) UL (ref 1–4)
LYMPHOCYTES NFR BLD AUTO: 9 %
MCH RBC QN AUTO: 29.3 PG (ref 26–34)
MCHC RBC AUTO-ENTMCNC: 31.2 G/DL (ref 31–37)
MCV RBC AUTO: 94 FL
MICROALBUMIN UR-MCNC: 2.24 MG/DL
MICROALBUMIN/CREAT 24H UR-RTO: 79.4 UG/MG (ref ?–30)
MONOCYTES # BLD AUTO: 0.72 X10(3) UL (ref 0.1–1)
MONOCYTES NFR BLD AUTO: 10.2 %
NEUTROPHILS # BLD AUTO: 5.33 X10 (3) UL (ref 1.5–7.7)
NEUTROPHILS # BLD AUTO: 5.33 X10(3) UL (ref 1.5–7.7)
NEUTROPHILS NFR BLD AUTO: 75.3 %
NONHDLC SERPL-MCNC: 86 MG/DL (ref ?–130)
OSMOLALITY SERPL CALC.SUM OF ELEC: 290 MOSM/KG (ref 275–295)
PLATELET # BLD AUTO: 259 10(3)UL (ref 150–450)
POTASSIUM SERPL-SCNC: 4.3 MMOL/L (ref 3.5–5.1)
PROT SERPL-MCNC: 6.9 G/DL (ref 6.4–8.2)
RBC # BLD AUTO: 5.49 X10(6)UL
SODIUM SERPL-SCNC: 139 MMOL/L (ref 136–145)
TRIGL SERPL-MCNC: 47 MG/DL (ref 30–149)
VLDLC SERPL CALC-MCNC: 7 MG/DL (ref 0–30)
WBC # BLD AUTO: 7.1 X10(3) UL (ref 4–11)

## 2022-09-15 PROCEDURE — 80053 COMPREHEN METABOLIC PANEL: CPT

## 2022-09-15 PROCEDURE — 36415 COLL VENOUS BLD VENIPUNCTURE: CPT

## 2022-09-15 PROCEDURE — 83036 HEMOGLOBIN GLYCOSYLATED A1C: CPT

## 2022-09-15 PROCEDURE — 82570 ASSAY OF URINE CREATININE: CPT

## 2022-09-15 PROCEDURE — 85025 COMPLETE CBC W/AUTO DIFF WBC: CPT

## 2022-09-15 PROCEDURE — 82043 UR ALBUMIN QUANTITATIVE: CPT

## 2022-09-15 PROCEDURE — 80061 LIPID PANEL: CPT

## 2022-09-17 NOTE — PROGRESS NOTES
We will discuss test results on appointment on 10/26/2022  Mild increase in urine microalbumin creatinine ratio  Hemoglobin A1c improving.   Kidney function liver function tests are normal  Lipids are normal  White blood cell and red blood cell counts are normal

## 2022-11-11 ENCOUNTER — OFFICE VISIT (OUTPATIENT)
Dept: FAMILY MEDICINE CLINIC | Facility: CLINIC | Age: 77
End: 2022-11-11
Payer: MEDICARE

## 2022-11-11 VITALS
DIASTOLIC BLOOD PRESSURE: 78 MMHG | TEMPERATURE: 98 F | WEIGHT: 214 LBS | BODY MASS INDEX: 32.43 KG/M2 | HEIGHT: 68 IN | HEART RATE: 77 BPM | RESPIRATION RATE: 18 BRPM | OXYGEN SATURATION: 98 % | SYSTOLIC BLOOD PRESSURE: 128 MMHG

## 2022-11-11 DIAGNOSIS — L84 PRE-ULCERATIVE CORN OR CALLOUS: ICD-10-CM

## 2022-11-11 DIAGNOSIS — E66.9 OBESITY (BMI 30.0-34.9): ICD-10-CM

## 2022-11-11 DIAGNOSIS — I51.7 LVH (LEFT VENTRICULAR HYPERTROPHY): ICD-10-CM

## 2022-11-11 DIAGNOSIS — R80.9 CONTROLLED TYPE 2 DIABETES MELLITUS WITH MICROALBUMINURIA, WITHOUT LONG-TERM CURRENT USE OF INSULIN (HCC): ICD-10-CM

## 2022-11-11 DIAGNOSIS — J45.40 MODERATE PERSISTENT ASTHMA WITHOUT COMPLICATION: ICD-10-CM

## 2022-11-11 DIAGNOSIS — Z86.010 PERSONAL HISTORY OF COLONIC POLYPS: ICD-10-CM

## 2022-11-11 DIAGNOSIS — I47.1 PSVT (PAROXYSMAL SUPRAVENTRICULAR TACHYCARDIA) (HCC): ICD-10-CM

## 2022-11-11 DIAGNOSIS — Z12.11 COLON CANCER SCREENING: ICD-10-CM

## 2022-11-11 DIAGNOSIS — I65.23 BILATERAL CAROTID ARTERY STENOSIS: ICD-10-CM

## 2022-11-11 DIAGNOSIS — Z97.4 WEARS HEARING AID: ICD-10-CM

## 2022-11-11 DIAGNOSIS — I77.810 AORTIC ECTASIA, THORACIC (HCC): ICD-10-CM

## 2022-11-11 DIAGNOSIS — I87.2 VENOUS INSUFFICIENCY OF BOTH LOWER EXTREMITIES: ICD-10-CM

## 2022-11-11 DIAGNOSIS — Z86.69 HISTORY OF OBSTRUCTIVE SLEEP APNEA: ICD-10-CM

## 2022-11-11 DIAGNOSIS — I10 ESSENTIAL HYPERTENSION: ICD-10-CM

## 2022-11-11 DIAGNOSIS — R06.83 SNORING: ICD-10-CM

## 2022-11-11 DIAGNOSIS — I25.83 CORONARY ATHEROSCLEROSIS DUE TO LIPID RICH PLAQUE: ICD-10-CM

## 2022-11-11 DIAGNOSIS — Z13.0 SCREENING FOR DEFICIENCY ANEMIA: ICD-10-CM

## 2022-11-11 DIAGNOSIS — I77.810 DILATATION OF THORACIC AORTA (HCC): ICD-10-CM

## 2022-11-11 DIAGNOSIS — R93.1 ABNORMAL SCREENING CT OF HEART: ICD-10-CM

## 2022-11-11 DIAGNOSIS — I25.10 CORONARY ATHEROSCLEROSIS DUE TO LIPID RICH PLAQUE: ICD-10-CM

## 2022-11-11 DIAGNOSIS — Z12.5 SCREENING PSA (PROSTATE SPECIFIC ANTIGEN): ICD-10-CM

## 2022-11-11 DIAGNOSIS — E11.29 CONTROLLED TYPE 2 DIABETES MELLITUS WITH MICROALBUMINURIA, WITHOUT LONG-TERM CURRENT USE OF INSULIN (HCC): ICD-10-CM

## 2022-11-11 DIAGNOSIS — Z00.00 ENCOUNTER FOR ANNUAL HEALTH EXAMINATION: Primary | ICD-10-CM

## 2022-11-11 DIAGNOSIS — Z23 NEED FOR INFLUENZA VACCINATION: ICD-10-CM

## 2022-11-11 DIAGNOSIS — E78.5 HYPERLIPIDEMIA LDL GOAL <70: ICD-10-CM

## 2022-11-11 RX ORDER — MONTELUKAST SODIUM 10 MG/1
10 TABLET ORAL DAILY
Qty: 90 TABLET | Refills: 1 | Status: SHIPPED | OUTPATIENT
Start: 2022-11-11

## 2022-11-11 RX ORDER — LANCETS 33 GAUGE
1 EACH MISCELLANEOUS 2 TIMES DAILY
Qty: 200 EACH | Refills: 1 | Status: SHIPPED | OUTPATIENT
Start: 2022-11-11

## 2022-11-11 RX ORDER — EMPAGLIFLOZIN 25 MG/1
1 TABLET, FILM COATED ORAL DAILY
Qty: 90 TABLET | Refills: 0 | Status: SHIPPED | OUTPATIENT
Start: 2022-11-11

## 2022-11-11 RX ORDER — GLIPIZIDE 5 MG/1
5 TABLET ORAL
Qty: 180 TABLET | Refills: 0 | Status: SHIPPED | OUTPATIENT
Start: 2022-11-11

## 2022-11-11 RX ORDER — FLUTICASONE PROPIONATE AND SALMETEROL 250; 50 UG/1; UG/1
1 POWDER RESPIRATORY (INHALATION) EVERY MORNING
Qty: 1 EACH | Refills: 1 | Status: SHIPPED | OUTPATIENT
Start: 2022-11-11

## 2022-11-11 RX ORDER — ALBUTEROL SULFATE 90 UG/1
2 AEROSOL, METERED RESPIRATORY (INHALATION)
Qty: 1 EACH | Refills: 0 | Status: SHIPPED | OUTPATIENT
Start: 2022-11-11

## 2022-11-11 RX ORDER — ROSUVASTATIN CALCIUM 40 MG/1
40 TABLET, COATED ORAL NIGHTLY
Qty: 90 TABLET | Refills: 0 | Status: SHIPPED | OUTPATIENT
Start: 2022-11-11

## 2022-11-11 RX ORDER — METOPROLOL SUCCINATE 50 MG/1
50 TABLET, EXTENDED RELEASE ORAL DAILY
COMMUNITY

## 2022-11-11 RX ORDER — LISINOPRIL 20 MG/1
20 TABLET ORAL DAILY
COMMUNITY
Start: 2022-10-05

## 2022-11-12 PROBLEM — I49.3 PVC (PREMATURE VENTRICULAR CONTRACTION): Status: RESOLVED | Noted: 2021-10-19 | Resolved: 2022-11-12

## 2022-11-12 PROBLEM — M48.10 DISH (DIFFUSE IDIOPATHIC SKELETAL HYPEROSTOSIS): Status: RESOLVED | Noted: 2017-04-17 | Resolved: 2022-11-12

## 2022-11-12 PROBLEM — Z80.0 FAMILY HISTORY OF MALIGNANT NEOPLASM OF DIGESTIVE ORGAN: Status: RESOLVED | Noted: 2019-10-31 | Resolved: 2022-11-12

## 2022-11-12 PROBLEM — R63.4 RECENT UNEXPLAINED WEIGHT LOSS: Status: RESOLVED | Noted: 2021-10-19 | Resolved: 2022-11-12

## 2022-11-12 PROBLEM — E11.65 UNCONTROLLED TYPE 2 DIABETES MELLITUS WITH HYPERGLYCEMIA (HCC): Status: RESOLVED | Noted: 2020-10-16 | Resolved: 2022-11-12

## 2022-11-12 PROBLEM — H90.3 SENSORY HEARING LOSS, BILATERAL: Status: RESOLVED | Noted: 2017-12-29 | Resolved: 2022-11-12

## 2022-11-12 PROBLEM — R63.0 DECREASED APPETITE: Status: RESOLVED | Noted: 2021-10-19 | Resolved: 2022-11-12

## 2022-11-12 PROBLEM — D22.9 MULTIPLE PIGMENTED NEVI: Status: RESOLVED | Noted: 2019-10-23 | Resolved: 2022-11-12

## 2022-11-21 ENCOUNTER — TELEPHONE (OUTPATIENT)
Dept: FAMILY MEDICINE CLINIC | Facility: CLINIC | Age: 77
End: 2022-11-21

## 2022-12-12 ENCOUNTER — TELEPHONE (OUTPATIENT)
Dept: FAMILY MEDICINE CLINIC | Facility: CLINIC | Age: 77
End: 2022-12-12

## 2022-12-12 NOTE — TELEPHONE ENCOUNTER
Faxed ever signed request for documentation from certifying physician to [facility] at [phone].

## 2023-02-08 PROBLEM — D12.2 BENIGN NEOPLASM OF ASCENDING COLON: Status: ACTIVE | Noted: 2023-02-08

## 2023-02-08 PROBLEM — D12.3 BENIGN NEOPLASM OF TRANSVERSE COLON: Status: ACTIVE | Noted: 2023-02-08

## 2023-02-08 PROBLEM — D12.4 BENIGN NEOPLASM OF DESCENDING COLON: Status: ACTIVE | Noted: 2023-02-08

## 2023-02-08 PROBLEM — Z86.0101 HISTORY OF ADENOMATOUS POLYP OF COLON: Status: ACTIVE | Noted: 2023-02-08

## 2023-02-08 PROBLEM — Z80.0 FAMILY HISTORY OF COLON CANCER: Status: ACTIVE | Noted: 2023-02-08

## 2023-02-08 PROBLEM — Z86.010 HISTORY OF ADENOMATOUS POLYP OF COLON: Status: ACTIVE | Noted: 2023-02-08

## 2023-02-23 ENCOUNTER — PATIENT MESSAGE (OUTPATIENT)
Dept: FAMILY MEDICINE CLINIC | Facility: CLINIC | Age: 78
End: 2023-02-23

## 2023-02-24 NOTE — TELEPHONE ENCOUNTER
From: Lawerance Simpler  To: Dakota Petit PA-C  Sent: 2/23/2023 4:40 PM CST  Subject: Blood Draw    All I want to know is it the normal 10-11 hour water only procedure prior to blood draw.   Hope you are well  Osmin Doyle

## 2023-03-14 ENCOUNTER — LAB ENCOUNTER (OUTPATIENT)
Dept: LAB | Age: 78
End: 2023-03-14
Attending: FAMILY MEDICINE
Payer: MEDICARE

## 2023-03-14 DIAGNOSIS — E11.29 CONTROLLED TYPE 2 DIABETES MELLITUS WITH MICROALBUMINURIA, WITHOUT LONG-TERM CURRENT USE OF INSULIN (HCC): ICD-10-CM

## 2023-03-14 DIAGNOSIS — Z13.0 SCREENING FOR DEFICIENCY ANEMIA: ICD-10-CM

## 2023-03-14 DIAGNOSIS — Z12.5 SCREENING PSA (PROSTATE SPECIFIC ANTIGEN): ICD-10-CM

## 2023-03-14 DIAGNOSIS — I10 ESSENTIAL HYPERTENSION: ICD-10-CM

## 2023-03-14 DIAGNOSIS — R80.9 CONTROLLED TYPE 2 DIABETES MELLITUS WITH MICROALBUMINURIA, WITHOUT LONG-TERM CURRENT USE OF INSULIN (HCC): ICD-10-CM

## 2023-03-14 DIAGNOSIS — E78.5 HYPERLIPIDEMIA LDL GOAL <70: ICD-10-CM

## 2023-03-14 LAB
ALBUMIN SERPL-MCNC: 3.6 G/DL (ref 3.4–5)
ALBUMIN/GLOB SERPL: 1 {RATIO} (ref 1–2)
ALP LIVER SERPL-CCNC: 70 U/L
ALT SERPL-CCNC: 20 U/L
ANION GAP SERPL CALC-SCNC: 3 MMOL/L (ref 0–18)
AST SERPL-CCNC: 21 U/L (ref 15–37)
BASOPHILS # BLD AUTO: 0.05 X10(3) UL (ref 0–0.2)
BASOPHILS NFR BLD AUTO: 0.8 %
BILIRUB SERPL-MCNC: 0.6 MG/DL (ref 0.1–2)
BUN BLD-MCNC: 15 MG/DL (ref 7–18)
CALCIUM BLD-MCNC: 9.1 MG/DL (ref 8.5–10.1)
CHLORIDE SERPL-SCNC: 107 MMOL/L (ref 98–112)
CHOLEST SERPL-MCNC: 199 MG/DL (ref ?–200)
CO2 SERPL-SCNC: 30 MMOL/L (ref 21–32)
COMPLEXED PSA SERPL-MCNC: 0.67 NG/ML (ref ?–4)
CREAT BLD-MCNC: 1.18 MG/DL
CREAT UR-SCNC: 38.1 MG/DL
EOSINOPHIL # BLD AUTO: 0.48 X10(3) UL (ref 0–0.7)
EOSINOPHIL NFR BLD AUTO: 7.7 %
ERYTHROCYTE [DISTWIDTH] IN BLOOD BY AUTOMATED COUNT: 13.6 %
EST. AVERAGE GLUCOSE BLD GHB EST-MCNC: 154 MG/DL (ref 68–126)
FASTING PATIENT LIPID ANSWER: YES
FASTING STATUS PATIENT QL REPORTED: YES
GFR SERPLBLD BASED ON 1.73 SQ M-ARVRAT: 64 ML/MIN/1.73M2 (ref 60–?)
GLOBULIN PLAS-MCNC: 3.6 G/DL (ref 2.8–4.4)
GLUCOSE BLD-MCNC: 130 MG/DL (ref 70–99)
HBA1C MFR BLD: 7 % (ref ?–5.7)
HCT VFR BLD AUTO: 55.2 %
HDLC SERPL-MCNC: 57 MG/DL (ref 40–59)
HGB BLD-MCNC: 17.1 G/DL
IMM GRANULOCYTES # BLD AUTO: 0.02 X10(3) UL (ref 0–1)
IMM GRANULOCYTES NFR BLD: 0.3 %
LDLC SERPL CALC-MCNC: 122 MG/DL (ref ?–100)
LYMPHOCYTES # BLD AUTO: 0.89 X10(3) UL (ref 1–4)
LYMPHOCYTES NFR BLD AUTO: 14.2 %
MCH RBC QN AUTO: 28.8 PG (ref 26–34)
MCHC RBC AUTO-ENTMCNC: 31 G/DL (ref 31–37)
MCV RBC AUTO: 93.1 FL
MICROALBUMIN UR-MCNC: 3.13 MG/DL
MICROALBUMIN/CREAT 24H UR-RTO: 82.2 UG/MG (ref ?–30)
MONOCYTES # BLD AUTO: 0.66 X10(3) UL (ref 0.1–1)
MONOCYTES NFR BLD AUTO: 10.5 %
NEUTROPHILS # BLD AUTO: 4.16 X10 (3) UL (ref 1.5–7.7)
NEUTROPHILS # BLD AUTO: 4.16 X10(3) UL (ref 1.5–7.7)
NEUTROPHILS NFR BLD AUTO: 66.5 %
NONHDLC SERPL-MCNC: 142 MG/DL (ref ?–130)
OSMOLALITY SERPL CALC.SUM OF ELEC: 293 MOSM/KG (ref 275–295)
PLATELET # BLD AUTO: 255 10(3)UL (ref 150–450)
POTASSIUM SERPL-SCNC: 4.7 MMOL/L (ref 3.5–5.1)
PROT SERPL-MCNC: 7.2 G/DL (ref 6.4–8.2)
RBC # BLD AUTO: 5.93 X10(6)UL
SODIUM SERPL-SCNC: 140 MMOL/L (ref 136–145)
TRIGL SERPL-MCNC: 113 MG/DL (ref 30–149)
TSI SER-ACNC: 3.51 MIU/ML (ref 0.36–3.74)
VLDLC SERPL CALC-MCNC: 20 MG/DL (ref 0–30)
WBC # BLD AUTO: 6.3 X10(3) UL (ref 4–11)

## 2023-03-14 PROCEDURE — 85025 COMPLETE CBC W/AUTO DIFF WBC: CPT

## 2023-03-14 PROCEDURE — 80053 COMPREHEN METABOLIC PANEL: CPT

## 2023-03-14 PROCEDURE — 84443 ASSAY THYROID STIM HORMONE: CPT

## 2023-03-14 PROCEDURE — 80061 LIPID PANEL: CPT

## 2023-03-14 PROCEDURE — 82043 UR ALBUMIN QUANTITATIVE: CPT

## 2023-03-14 PROCEDURE — 82570 ASSAY OF URINE CREATININE: CPT

## 2023-03-14 PROCEDURE — 83036 HEMOGLOBIN GLYCOSYLATED A1C: CPT

## 2023-03-14 PROCEDURE — 36415 COLL VENOUS BLD VENIPUNCTURE: CPT

## 2023-03-15 NOTE — PROGRESS NOTES
Normal white blood cell counts. Elevated red blood cells and hematocrit repeat CBC in 3 months. Normal kidney and liver function testing. Elevated LDL will discuss at follow up visit.   Normal thyroid testing  Normal prostate testing  Urine microalbumin elevated but stable repeat in 6 months  Sincerely,   Mal Nieto PA-C

## 2023-03-15 NOTE — PROGRESS NOTES
Hemoglobin A1c slightly increased from 6 months ago but stable goal is to keep it below 7.5.   We will discuss at office visit 03/17/2023 at 8:30 AM

## 2023-03-17 ENCOUNTER — OFFICE VISIT (OUTPATIENT)
Dept: FAMILY MEDICINE CLINIC | Facility: CLINIC | Age: 78
End: 2023-03-17
Payer: MEDICARE

## 2023-03-17 VITALS
SYSTOLIC BLOOD PRESSURE: 136 MMHG | HEIGHT: 68 IN | HEART RATE: 98 BPM | TEMPERATURE: 98 F | BODY MASS INDEX: 32.74 KG/M2 | RESPIRATION RATE: 18 BRPM | OXYGEN SATURATION: 98 % | DIASTOLIC BLOOD PRESSURE: 78 MMHG | WEIGHT: 216 LBS

## 2023-03-17 DIAGNOSIS — R06.2 WHEEZING: ICD-10-CM

## 2023-03-17 DIAGNOSIS — R71.8 ELEVATED HEMATOCRIT: ICD-10-CM

## 2023-03-17 DIAGNOSIS — L84 PRE-ULCERATIVE CORN OR CALLOUS: ICD-10-CM

## 2023-03-17 DIAGNOSIS — J45.31 MILD PERSISTENT ASTHMA WITH ACUTE EXACERBATION: ICD-10-CM

## 2023-03-17 DIAGNOSIS — I87.2 VENOUS INSUFFICIENCY OF BOTH LOWER EXTREMITIES: ICD-10-CM

## 2023-03-17 DIAGNOSIS — E11.29 CONTROLLED TYPE 2 DIABETES MELLITUS WITH MICROALBUMINURIA, WITHOUT LONG-TERM CURRENT USE OF INSULIN (HCC): Primary | ICD-10-CM

## 2023-03-17 DIAGNOSIS — R80.9 CONTROLLED TYPE 2 DIABETES MELLITUS WITH MICROALBUMINURIA, WITHOUT LONG-TERM CURRENT USE OF INSULIN (HCC): Primary | ICD-10-CM

## 2023-03-17 DIAGNOSIS — E78.5 HYPERLIPIDEMIA LDL GOAL <70: ICD-10-CM

## 2023-03-17 PROCEDURE — 99214 OFFICE O/P EST MOD 30 MIN: CPT | Performed by: FAMILY MEDICINE

## 2023-03-17 RX ORDER — BLOOD-GLUCOSE METER
EACH MISCELLANEOUS
Qty: 1 KIT | Refills: 0 | Status: SHIPPED | OUTPATIENT
Start: 2023-03-17

## 2023-03-17 RX ORDER — ZOSTER VACCINE RECOMBINANT, ADJUVANTED 50 MCG/0.5
50 KIT INTRAMUSCULAR ONCE
Qty: 1 EACH | Refills: 1 | Status: SHIPPED | OUTPATIENT
Start: 2023-03-17 | End: 2023-03-17

## 2023-03-17 RX ORDER — FLUTICASONE PROPIONATE 110 UG/1
2 AEROSOL, METERED RESPIRATORY (INHALATION) 2 TIMES DAILY
Qty: 1 EACH | Refills: 5 | Status: SHIPPED | OUTPATIENT
Start: 2023-03-17 | End: 2024-03-11

## 2023-03-19 PROBLEM — J45.31 MILD PERSISTENT ASTHMA WITH ACUTE EXACERBATION: Status: ACTIVE | Noted: 2023-03-19

## 2023-03-19 PROBLEM — J45.31 MILD PERSISTENT ASTHMA WITH ACUTE EXACERBATION (HCC): Status: ACTIVE | Noted: 2023-03-19

## 2023-03-21 ENCOUNTER — HOSPITAL ENCOUNTER (OUTPATIENT)
Dept: GENERAL RADIOLOGY | Age: 78
Discharge: HOME | End: 2023-03-21
Attending: FAMILY MEDICINE
Payer: MEDICARE

## 2023-03-21 DIAGNOSIS — R06.2 WHEEZING: ICD-10-CM

## 2023-03-21 DIAGNOSIS — J45.31 MILD PERSISTENT ASTHMA WITH ACUTE EXACERBATION: ICD-10-CM

## 2023-03-21 PROCEDURE — 71046 X-RAY EXAM CHEST 2 VIEWS: CPT | Performed by: FAMILY MEDICINE

## 2023-03-21 NOTE — PROGRESS NOTES
Lung x-ray scarring present start the Flovent take daily and follow-up in 1 month for a repeat lung exam.

## 2023-03-27 ENCOUNTER — TELEPHONE (OUTPATIENT)
Dept: FAMILY MEDICINE CLINIC | Facility: CLINIC | Age: 78
End: 2023-03-27

## 2023-06-29 ENCOUNTER — LAB ENCOUNTER (OUTPATIENT)
Dept: LAB | Age: 78
End: 2023-06-29
Attending: FAMILY MEDICINE
Payer: MEDICARE

## 2023-06-29 DIAGNOSIS — R71.8 ELEVATED HEMATOCRIT: ICD-10-CM

## 2023-06-29 DIAGNOSIS — E11.29 CONTROLLED TYPE 2 DIABETES MELLITUS WITH MICROALBUMINURIA, WITHOUT LONG-TERM CURRENT USE OF INSULIN (HCC): ICD-10-CM

## 2023-06-29 DIAGNOSIS — R80.9 CONTROLLED TYPE 2 DIABETES MELLITUS WITH MICROALBUMINURIA, WITHOUT LONG-TERM CURRENT USE OF INSULIN (HCC): ICD-10-CM

## 2023-06-29 DIAGNOSIS — E78.5 HYPERLIPIDEMIA LDL GOAL <70: ICD-10-CM

## 2023-06-29 LAB
BASOPHILS # BLD AUTO: 0.06 X10(3) UL (ref 0–0.2)
BASOPHILS NFR BLD AUTO: 0.9 %
CHOLEST SERPL-MCNC: 202 MG/DL (ref ?–200)
CREAT UR-SCNC: 76.9 MG/DL
EOSINOPHIL # BLD AUTO: 0.32 X10(3) UL (ref 0–0.7)
EOSINOPHIL NFR BLD AUTO: 4.8 %
ERYTHROCYTE [DISTWIDTH] IN BLOOD BY AUTOMATED COUNT: 13.7 %
EST. AVERAGE GLUCOSE BLD GHB EST-MCNC: 169 MG/DL (ref 68–126)
FASTING PATIENT LIPID ANSWER: YES
HBA1C MFR BLD: 7.5 % (ref ?–5.7)
HCT VFR BLD AUTO: 49.7 %
HDLC SERPL-MCNC: 61 MG/DL (ref 40–59)
HGB BLD-MCNC: 15.8 G/DL
IMM GRANULOCYTES # BLD AUTO: 0.02 X10(3) UL (ref 0–1)
IMM GRANULOCYTES NFR BLD: 0.3 %
LDLC SERPL CALC-MCNC: 133 MG/DL (ref ?–100)
LYMPHOCYTES # BLD AUTO: 0.73 X10(3) UL (ref 1–4)
LYMPHOCYTES NFR BLD AUTO: 11 %
MCH RBC QN AUTO: 28.9 PG (ref 26–34)
MCHC RBC AUTO-ENTMCNC: 31.8 G/DL (ref 31–37)
MCV RBC AUTO: 91 FL
MICROALBUMIN UR-MCNC: 4.25 MG/DL
MICROALBUMIN/CREAT 24H UR-RTO: 55.3 UG/MG (ref ?–30)
MONOCYTES # BLD AUTO: 0.76 X10(3) UL (ref 0.1–1)
MONOCYTES NFR BLD AUTO: 11.5 %
NEUTROPHILS # BLD AUTO: 4.72 X10 (3) UL (ref 1.5–7.7)
NEUTROPHILS # BLD AUTO: 4.72 X10(3) UL (ref 1.5–7.7)
NEUTROPHILS NFR BLD AUTO: 71.5 %
NONHDLC SERPL-MCNC: 141 MG/DL (ref ?–130)
PLATELET # BLD AUTO: 241 10(3)UL (ref 150–450)
RBC # BLD AUTO: 5.46 X10(6)UL
TRIGL SERPL-MCNC: 41 MG/DL (ref 30–149)
VLDLC SERPL CALC-MCNC: 7 MG/DL (ref 0–30)
WBC # BLD AUTO: 6.6 X10(3) UL (ref 4–11)

## 2023-06-29 PROCEDURE — 80061 LIPID PANEL: CPT

## 2023-06-29 PROCEDURE — 82043 UR ALBUMIN QUANTITATIVE: CPT

## 2023-06-29 PROCEDURE — 82570 ASSAY OF URINE CREATININE: CPT

## 2023-06-29 PROCEDURE — 83036 HEMOGLOBIN GLYCOSYLATED A1C: CPT

## 2023-06-29 PROCEDURE — 85025 COMPLETE CBC W/AUTO DIFF WBC: CPT

## 2023-06-29 PROCEDURE — 36415 COLL VENOUS BLD VENIPUNCTURE: CPT

## 2023-07-03 ENCOUNTER — OFFICE VISIT (OUTPATIENT)
Dept: FAMILY MEDICINE CLINIC | Facility: CLINIC | Age: 78
End: 2023-07-03
Payer: MEDICARE

## 2023-07-03 VITALS
BODY MASS INDEX: 33.65 KG/M2 | HEART RATE: 86 BPM | DIASTOLIC BLOOD PRESSURE: 78 MMHG | HEIGHT: 68 IN | TEMPERATURE: 98 F | SYSTOLIC BLOOD PRESSURE: 138 MMHG | WEIGHT: 222 LBS | RESPIRATION RATE: 20 BRPM

## 2023-07-03 DIAGNOSIS — R80.9 TYPE 2 DIABETES MELLITUS WITH MICROALBUMINURIA, WITHOUT LONG-TERM CURRENT USE OF INSULIN: ICD-10-CM

## 2023-07-03 DIAGNOSIS — E11.29 TYPE 2 DIABETES MELLITUS WITH MICROALBUMINURIA, WITHOUT LONG-TERM CURRENT USE OF INSULIN: ICD-10-CM

## 2023-07-03 DIAGNOSIS — Z79.899 MEDICATION MANAGEMENT: ICD-10-CM

## 2023-07-03 DIAGNOSIS — E11.65 UNCONTROLLED TYPE 2 DIABETES MELLITUS WITH HYPERGLYCEMIA (HCC): Primary | ICD-10-CM

## 2023-07-03 DIAGNOSIS — E66.9 OBESITY (BMI 30.0-34.9): ICD-10-CM

## 2023-07-03 PROCEDURE — 99214 OFFICE O/P EST MOD 30 MIN: CPT | Performed by: FAMILY MEDICINE

## 2023-07-03 RX ORDER — LANCETS 33 GAUGE
1 EACH MISCELLANEOUS 2 TIMES DAILY
Qty: 200 EACH | Refills: 3 | Status: SHIPPED | OUTPATIENT
Start: 2023-07-03

## 2023-07-03 RX ORDER — ORAL SEMAGLUTIDE 3 MG/1
3 TABLET ORAL DAILY
Qty: 30 TABLET | Refills: 0 | Status: SHIPPED | OUTPATIENT
Start: 2023-07-03 | End: 2023-07-04

## 2023-07-03 RX ORDER — ZOSTER VACCINE RECOMBINANT, ADJUVANTED 50 MCG/0.5
50 KIT INTRAMUSCULAR ONCE
Qty: 1 EACH | Refills: 1 | Status: SHIPPED | OUTPATIENT
Start: 2023-07-03 | End: 2023-07-03

## 2023-07-03 RX ORDER — BLOOD-GLUCOSE METER
EACH MISCELLANEOUS
Qty: 1 KIT | Refills: 0 | Status: CANCELLED | OUTPATIENT
Start: 2023-07-03

## 2023-07-03 RX ORDER — BLOOD SUGAR DIAGNOSTIC
STRIP MISCELLANEOUS
Qty: 200 STRIP | Refills: 2 | Status: SHIPPED | OUTPATIENT
Start: 2023-07-03

## 2023-07-04 RX ORDER — ORAL SEMAGLUTIDE 3 MG/1
3 TABLET ORAL DAILY
Qty: 30 TABLET | Refills: 0 | Status: SHIPPED | OUTPATIENT
Start: 2023-07-04 | End: 2023-08-03

## 2023-07-26 ENCOUNTER — TELEPHONE (OUTPATIENT)
Dept: FAMILY MEDICINE CLINIC | Facility: CLINIC | Age: 78
End: 2023-07-26

## 2023-07-26 NOTE — TELEPHONE ENCOUNTER
Daniel faxing Medicare form needing more information regarding diabetic supplies and pt's A1C. Form completed by provider, Nilton Garcia PA-C and faxed back to [phone]. Copy sent to elijah.

## 2023-08-04 ENCOUNTER — OFFICE VISIT (OUTPATIENT)
Dept: FAMILY MEDICINE CLINIC | Facility: CLINIC | Age: 78
End: 2023-08-04
Payer: MEDICARE

## 2023-08-04 VITALS
BODY MASS INDEX: 31.83 KG/M2 | SYSTOLIC BLOOD PRESSURE: 134 MMHG | TEMPERATURE: 98 F | HEIGHT: 68 IN | HEART RATE: 94 BPM | DIASTOLIC BLOOD PRESSURE: 76 MMHG | OXYGEN SATURATION: 98 % | RESPIRATION RATE: 18 BRPM | WEIGHT: 210 LBS

## 2023-08-04 DIAGNOSIS — E11.29 TYPE 2 DIABETES MELLITUS WITH MICROALBUMINURIA, WITHOUT LONG-TERM CURRENT USE OF INSULIN: ICD-10-CM

## 2023-08-04 DIAGNOSIS — R80.9 TYPE 2 DIABETES MELLITUS WITH MICROALBUMINURIA, WITHOUT LONG-TERM CURRENT USE OF INSULIN: ICD-10-CM

## 2023-08-04 DIAGNOSIS — E66.9 OBESITY (BMI 30.0-34.9): Primary | ICD-10-CM

## 2023-08-04 PROCEDURE — 99214 OFFICE O/P EST MOD 30 MIN: CPT | Performed by: FAMILY MEDICINE

## 2023-08-04 RX ORDER — ORAL SEMAGLUTIDE 3 MG/1
3 TABLET ORAL DAILY
Qty: 30 TABLET | Refills: 0 | Status: SHIPPED | OUTPATIENT
Start: 2023-08-04

## 2023-08-04 RX ORDER — ORAL SEMAGLUTIDE 3 MG/1
TABLET ORAL
COMMUNITY
End: 2023-08-04

## 2023-08-30 ENCOUNTER — PATIENT MESSAGE (OUTPATIENT)
Dept: FAMILY MEDICINE CLINIC | Facility: CLINIC | Age: 78
End: 2023-08-30

## 2023-08-30 RX ORDER — ORAL SEMAGLUTIDE 3 MG/1
3 TABLET ORAL DAILY
Qty: 30 TABLET | Refills: 0 | Status: SHIPPED | OUTPATIENT
Start: 2023-08-30

## 2023-09-11 ENCOUNTER — OFFICE VISIT (OUTPATIENT)
Dept: FAMILY MEDICINE CLINIC | Facility: CLINIC | Age: 78
End: 2023-09-11
Payer: MEDICARE

## 2023-09-11 ENCOUNTER — HOSPITAL ENCOUNTER (OUTPATIENT)
Dept: GENERAL RADIOLOGY | Age: 78
Discharge: HOME | End: 2023-09-11
Attending: FAMILY MEDICINE
Payer: MEDICARE

## 2023-09-11 VITALS
WEIGHT: 203 LBS | BODY MASS INDEX: 30.77 KG/M2 | HEIGHT: 68 IN | OXYGEN SATURATION: 98 % | SYSTOLIC BLOOD PRESSURE: 166 MMHG | HEART RATE: 74 BPM | DIASTOLIC BLOOD PRESSURE: 86 MMHG | RESPIRATION RATE: 18 BRPM | TEMPERATURE: 98 F

## 2023-09-11 DIAGNOSIS — R80.9 TYPE 2 DIABETES MELLITUS WITH MICROALBUMINURIA, WITHOUT LONG-TERM CURRENT USE OF INSULIN: Primary | ICD-10-CM

## 2023-09-11 DIAGNOSIS — R09.89 RHONCHI AT RIGHT LUNG BASE: ICD-10-CM

## 2023-09-11 DIAGNOSIS — E66.9 OBESITY (BMI 30.0-34.9): ICD-10-CM

## 2023-09-11 DIAGNOSIS — J06.9 ACUTE URI: ICD-10-CM

## 2023-09-11 DIAGNOSIS — Z23 NEED FOR INFLUENZA VACCINATION: ICD-10-CM

## 2023-09-11 DIAGNOSIS — J18.9 PNEUMONIA OF RIGHT LOWER LOBE DUE TO INFECTIOUS ORGANISM: ICD-10-CM

## 2023-09-11 DIAGNOSIS — E11.29 TYPE 2 DIABETES MELLITUS WITH MICROALBUMINURIA, WITHOUT LONG-TERM CURRENT USE OF INSULIN: Primary | ICD-10-CM

## 2023-09-11 LAB
COVID19 BINAX NOW ANTIGEN: NOT DETECTED
OPERATOR ID: NORMAL
POCT LOT NUMBER: NORMAL

## 2023-09-11 PROCEDURE — 71046 X-RAY EXAM CHEST 2 VIEWS: CPT | Performed by: FAMILY MEDICINE

## 2023-09-11 PROCEDURE — 99214 OFFICE O/P EST MOD 30 MIN: CPT | Performed by: FAMILY MEDICINE

## 2023-09-11 RX ORDER — ORAL SEMAGLUTIDE 7 MG/1
7 TABLET ORAL EVERY MORNING
Qty: 30 TABLET | Refills: 0 | Status: SHIPPED | OUTPATIENT
Start: 2023-09-11 | End: 2023-10-11

## 2023-09-11 RX ORDER — AMOXICILLIN AND CLAVULANATE POTASSIUM 875; 125 MG/1; MG/1
1 TABLET, FILM COATED ORAL 2 TIMES DAILY
Qty: 20 TABLET | Refills: 0 | Status: SHIPPED | OUTPATIENT
Start: 2023-09-11 | End: 2023-09-21

## 2023-09-11 RX ORDER — AZITHROMYCIN 250 MG/1
TABLET, FILM COATED ORAL
Qty: 6 TABLET | Refills: 0 | Status: SHIPPED | OUTPATIENT
Start: 2023-09-11 | End: 2023-09-15

## 2023-09-12 PROBLEM — J45.40 MODERATE PERSISTENT ASTHMA WITHOUT COMPLICATION: Status: ACTIVE | Noted: 2023-03-19

## 2023-09-12 PROBLEM — J18.9 PNEUMONIA OF RIGHT LOWER LOBE DUE TO INFECTIOUS ORGANISM: Status: ACTIVE | Noted: 2023-09-12

## 2023-09-12 PROBLEM — J45.40 MODERATE PERSISTENT ASTHMA WITHOUT COMPLICATION (HCC): Status: ACTIVE | Noted: 2023-03-19

## 2023-10-04 ENCOUNTER — MED REC SCAN ONLY (OUTPATIENT)
Dept: FAMILY MEDICINE CLINIC | Facility: CLINIC | Age: 78
End: 2023-10-04

## 2023-10-05 ENCOUNTER — HOSPITAL ENCOUNTER (OUTPATIENT)
Dept: GENERAL RADIOLOGY | Age: 78
Discharge: HOME | End: 2023-10-05
Attending: FAMILY MEDICINE
Payer: MEDICARE

## 2023-10-05 DIAGNOSIS — J18.9 PNEUMONIA OF RIGHT LOWER LOBE DUE TO INFECTIOUS ORGANISM: ICD-10-CM

## 2023-10-05 PROCEDURE — 71046 X-RAY EXAM CHEST 2 VIEWS: CPT | Performed by: FAMILY MEDICINE

## 2023-10-06 ENCOUNTER — LAB ENCOUNTER (OUTPATIENT)
Dept: LAB | Age: 78
End: 2023-10-06
Attending: FAMILY MEDICINE
Payer: MEDICARE

## 2023-10-06 DIAGNOSIS — E11.29 TYPE 2 DIABETES MELLITUS WITH MICROALBUMINURIA, WITHOUT LONG-TERM CURRENT USE OF INSULIN: ICD-10-CM

## 2023-10-06 DIAGNOSIS — R80.9 TYPE 2 DIABETES MELLITUS WITH MICROALBUMINURIA, WITHOUT LONG-TERM CURRENT USE OF INSULIN: ICD-10-CM

## 2023-10-06 DIAGNOSIS — E11.65 UNCONTROLLED TYPE 2 DIABETES MELLITUS WITH HYPERGLYCEMIA (HCC): ICD-10-CM

## 2023-10-06 LAB
ALBUMIN SERPL-MCNC: 3.8 G/DL (ref 3.4–5)
ALBUMIN/GLOB SERPL: 1.1 {RATIO} (ref 1–2)
ALP LIVER SERPL-CCNC: 70 U/L
ALT SERPL-CCNC: 27 U/L
ANION GAP SERPL CALC-SCNC: 3 MMOL/L (ref 0–18)
AST SERPL-CCNC: 15 U/L (ref 15–37)
BILIRUB SERPL-MCNC: 0.9 MG/DL (ref 0.1–2)
BUN BLD-MCNC: 19 MG/DL (ref 7–18)
CALCIUM BLD-MCNC: 9.2 MG/DL (ref 8.5–10.1)
CHLORIDE SERPL-SCNC: 105 MMOL/L (ref 98–112)
CHOLEST SERPL-MCNC: 144 MG/DL (ref ?–200)
CO2 SERPL-SCNC: 29 MMOL/L (ref 21–32)
CREAT BLD-MCNC: 1.11 MG/DL
CREAT UR-SCNC: 91.6 MG/DL
EGFRCR SERPLBLD CKD-EPI 2021: 69 ML/MIN/1.73M2 (ref 60–?)
FASTING PATIENT LIPID ANSWER: YES
FASTING STATUS PATIENT QL REPORTED: YES
GLOBULIN PLAS-MCNC: 3.6 G/DL (ref 2.8–4.4)
GLUCOSE BLD-MCNC: 111 MG/DL (ref 70–99)
HDLC SERPL-MCNC: 61 MG/DL (ref 40–59)
LDLC SERPL CALC-MCNC: 72 MG/DL (ref ?–100)
MICROALBUMIN UR-MCNC: 5.61 MG/DL
MICROALBUMIN/CREAT 24H UR-RTO: 61.2 UG/MG (ref ?–30)
NONHDLC SERPL-MCNC: 83 MG/DL (ref ?–130)
OSMOLALITY SERPL CALC.SUM OF ELEC: 287 MOSM/KG (ref 275–295)
POTASSIUM SERPL-SCNC: 4.1 MMOL/L (ref 3.5–5.1)
PROT SERPL-MCNC: 7.4 G/DL (ref 6.4–8.2)
SODIUM SERPL-SCNC: 137 MMOL/L (ref 136–145)
TRIGL SERPL-MCNC: 48 MG/DL (ref 30–149)
VLDLC SERPL CALC-MCNC: 7 MG/DL (ref 0–30)

## 2023-10-06 PROCEDURE — 82043 UR ALBUMIN QUANTITATIVE: CPT

## 2023-10-06 PROCEDURE — 36415 COLL VENOUS BLD VENIPUNCTURE: CPT

## 2023-10-06 PROCEDURE — 82570 ASSAY OF URINE CREATININE: CPT

## 2023-10-06 PROCEDURE — 80053 COMPREHEN METABOLIC PANEL: CPT

## 2023-10-06 PROCEDURE — 80061 LIPID PANEL: CPT

## 2023-10-06 PROCEDURE — 83036 HEMOGLOBIN GLYCOSYLATED A1C: CPT

## 2023-10-07 LAB
EST. AVERAGE GLUCOSE BLD GHB EST-MCNC: 146 MG/DL (ref 68–126)
HBA1C MFR BLD: 6.7 % (ref ?–5.7)

## 2023-10-07 NOTE — PROGRESS NOTES
Lipids look much better continue with rosuvastatin 40 mg  Kidney function liver function tests are normal.  Urine microalbumin creatinine ratio is stable I will repeat again in 6 months  Still awaiting hemoglobin A1c.

## 2023-10-08 DIAGNOSIS — R80.9 CONTROLLED TYPE 2 DIABETES MELLITUS WITH MICROALBUMINURIA, WITHOUT LONG-TERM CURRENT USE OF INSULIN: ICD-10-CM

## 2023-10-08 DIAGNOSIS — E11.29 CONTROLLED TYPE 2 DIABETES MELLITUS WITH MICROALBUMINURIA, WITHOUT LONG-TERM CURRENT USE OF INSULIN: ICD-10-CM

## 2023-10-09 RX ORDER — GLIPIZIDE 5 MG/1
5 TABLET ORAL
Qty: 180 TABLET | Refills: 0 | Status: SHIPPED | OUTPATIENT
Start: 2023-10-09

## 2023-10-09 NOTE — TELEPHONE ENCOUNTER
Medication(s) to Refill:   Requested Prescriptions     Pending Prescriptions Disp Refills    GLIPIZIDE 5 MG Oral Tab [Pharmacy Med Name: GLIPIZIDE 5MG TABLETS] 180 tablet 0     Sig: TAKE 1 TABLET(5 MG) BY MOUTH TWICE DAILY BEFORE MEALS     Last Time Medication was Filled:  11/11/22    Recent Visits  Date Type Provider Dept   09/11/23 Office Visit BERTIN Hood     Future Appointments  Date Type Provider Dept   10/11/23 Appointment BERTIN Hood

## 2023-10-11 ENCOUNTER — OFFICE VISIT (OUTPATIENT)
Dept: FAMILY MEDICINE CLINIC | Facility: CLINIC | Age: 78
End: 2023-10-11
Payer: MEDICARE

## 2023-10-11 VITALS
HEART RATE: 98 BPM | DIASTOLIC BLOOD PRESSURE: 78 MMHG | HEIGHT: 68 IN | SYSTOLIC BLOOD PRESSURE: 138 MMHG | WEIGHT: 186 LBS | OXYGEN SATURATION: 98 % | RESPIRATION RATE: 18 BRPM | TEMPERATURE: 97 F | BODY MASS INDEX: 28.19 KG/M2

## 2023-10-11 DIAGNOSIS — R80.9 TYPE 2 DIABETES MELLITUS WITH MICROALBUMINURIA, WITHOUT LONG-TERM CURRENT USE OF INSULIN: Primary | ICD-10-CM

## 2023-10-11 DIAGNOSIS — Z23 NEED FOR INFLUENZA VACCINATION: ICD-10-CM

## 2023-10-11 DIAGNOSIS — E11.29 TYPE 2 DIABETES MELLITUS WITH MICROALBUMINURIA, WITHOUT LONG-TERM CURRENT USE OF INSULIN: Primary | ICD-10-CM

## 2023-10-11 DIAGNOSIS — Z71.3 WEIGHT LOSS COUNSELING, ENCOUNTER FOR: ICD-10-CM

## 2023-10-11 DIAGNOSIS — E66.3 OVERWEIGHT (BMI 25.0-29.9): ICD-10-CM

## 2023-10-11 PROCEDURE — 99214 OFFICE O/P EST MOD 30 MIN: CPT | Performed by: FAMILY MEDICINE

## 2023-10-11 PROCEDURE — G0008 ADMIN INFLUENZA VIRUS VAC: HCPCS | Performed by: FAMILY MEDICINE

## 2023-10-11 PROCEDURE — 90662 IIV NO PRSV INCREASED AG IM: CPT | Performed by: FAMILY MEDICINE

## 2023-10-11 RX ORDER — ORAL SEMAGLUTIDE 3 MG/1
3 TABLET ORAL EVERY MORNING
Qty: 30 TABLET | Refills: 1 | Status: SHIPPED | OUTPATIENT
Start: 2023-10-11 | End: 2023-11-10

## 2023-11-20 ENCOUNTER — OFFICE VISIT (OUTPATIENT)
Dept: FAMILY MEDICINE CLINIC | Facility: CLINIC | Age: 78
End: 2023-11-20
Payer: MEDICARE

## 2023-11-20 VITALS
BODY MASS INDEX: 30.62 KG/M2 | TEMPERATURE: 98 F | HEART RATE: 80 BPM | SYSTOLIC BLOOD PRESSURE: 128 MMHG | RESPIRATION RATE: 16 BRPM | OXYGEN SATURATION: 98 % | HEIGHT: 68 IN | WEIGHT: 202 LBS | DIASTOLIC BLOOD PRESSURE: 74 MMHG

## 2023-11-20 DIAGNOSIS — I51.7 LVH (LEFT VENTRICULAR HYPERTROPHY): ICD-10-CM

## 2023-11-20 DIAGNOSIS — I77.810 DILATATION OF THORACIC AORTA (HCC): ICD-10-CM

## 2023-11-20 DIAGNOSIS — I25.10 CORONARY ATHEROSCLEROSIS DUE TO LIPID RICH PLAQUE: ICD-10-CM

## 2023-11-20 DIAGNOSIS — Z80.0 FAMILY HISTORY OF COLON CANCER: ICD-10-CM

## 2023-11-20 DIAGNOSIS — E11.29 TYPE 2 DIABETES MELLITUS WITH MICROALBUMINURIA, WITHOUT LONG-TERM CURRENT USE OF INSULIN: ICD-10-CM

## 2023-11-20 DIAGNOSIS — I87.2 VENOUS INSUFFICIENCY OF BOTH LOWER EXTREMITIES: ICD-10-CM

## 2023-11-20 DIAGNOSIS — E78.5 HYPERLIPIDEMIA LDL GOAL <70: ICD-10-CM

## 2023-11-20 DIAGNOSIS — I65.23 BILATERAL CAROTID ARTERY STENOSIS: ICD-10-CM

## 2023-11-20 DIAGNOSIS — Z12.5 SCREENING PSA (PROSTATE SPECIFIC ANTIGEN): ICD-10-CM

## 2023-11-20 DIAGNOSIS — Z97.4 WEARS HEARING AID: ICD-10-CM

## 2023-11-20 DIAGNOSIS — I47.10 PSVT (PAROXYSMAL SUPRAVENTRICULAR TACHYCARDIA): ICD-10-CM

## 2023-11-20 DIAGNOSIS — H61.23 BILATERAL HEARING LOSS DUE TO CERUMEN IMPACTION: ICD-10-CM

## 2023-11-20 DIAGNOSIS — Z86.010 PERSONAL HISTORY OF COLONIC POLYPS: ICD-10-CM

## 2023-11-20 DIAGNOSIS — Z86.69 HISTORY OF OBSTRUCTIVE SLEEP APNEA: ICD-10-CM

## 2023-11-20 DIAGNOSIS — E66.9 OBESITY (BMI 30.0-34.9): ICD-10-CM

## 2023-11-20 DIAGNOSIS — Z00.00 ENCOUNTER FOR ANNUAL HEALTH EXAMINATION: Primary | ICD-10-CM

## 2023-11-20 DIAGNOSIS — Z86.010 HISTORY OF ADENOMATOUS POLYP OF COLON: ICD-10-CM

## 2023-11-20 DIAGNOSIS — I10 ESSENTIAL HYPERTENSION: ICD-10-CM

## 2023-11-20 DIAGNOSIS — Z12.5 SCREENING FOR PROSTATE CANCER: ICD-10-CM

## 2023-11-20 DIAGNOSIS — R80.9 TYPE 2 DIABETES MELLITUS WITH MICROALBUMINURIA, WITHOUT LONG-TERM CURRENT USE OF INSULIN: ICD-10-CM

## 2023-11-20 DIAGNOSIS — Z79.899 MEDICATION MANAGEMENT: ICD-10-CM

## 2023-11-20 DIAGNOSIS — I25.83 CORONARY ATHEROSCLEROSIS DUE TO LIPID RICH PLAQUE: ICD-10-CM

## 2023-11-20 DIAGNOSIS — J45.40 MODERATE PERSISTENT ASTHMA WITHOUT COMPLICATION: ICD-10-CM

## 2023-11-20 RX ORDER — ZOSTER VACCINE RECOMBINANT, ADJUVANTED 50 MCG/0.5
50 KIT INTRAMUSCULAR ONCE
Qty: 2 EACH | Refills: 0 | Status: SHIPPED | OUTPATIENT
Start: 2023-11-20 | End: 2023-11-20

## 2023-11-20 RX ORDER — DEXAMETHASONE 4 MG/1
2 TABLET ORAL 2 TIMES DAILY
COMMUNITY
Start: 2023-10-25

## 2023-11-21 PROBLEM — H61.23 BILATERAL HEARING LOSS DUE TO CERUMEN IMPACTION: Status: ACTIVE | Noted: 2023-11-21

## 2023-11-21 PROBLEM — D12.3 BENIGN NEOPLASM OF TRANSVERSE COLON: Status: RESOLVED | Noted: 2023-02-08 | Resolved: 2023-11-21

## 2023-11-21 PROBLEM — Z79.899 MEDICATION MANAGEMENT: Status: ACTIVE | Noted: 2023-11-21

## 2023-11-21 PROBLEM — E66.3 OVERWEIGHT (BMI 25.0-29.9): Status: RESOLVED | Noted: 2023-10-11 | Resolved: 2023-11-21

## 2023-11-21 PROBLEM — J45.909 UNCOMPLICATED ASTHMA (HCC): Status: RESOLVED | Noted: 2017-03-02 | Resolved: 2023-11-21

## 2023-11-21 PROBLEM — D12.2 BENIGN NEOPLASM OF ASCENDING COLON: Status: RESOLVED | Noted: 2023-02-08 | Resolved: 2023-11-21

## 2023-11-21 PROBLEM — R80.9 POSITIVE FOR MICROALBUMINURIA: Status: RESOLVED | Noted: 2021-09-16 | Resolved: 2023-11-21

## 2023-11-21 PROBLEM — J18.9 PNEUMONIA OF RIGHT LOWER LOBE DUE TO INFECTIOUS ORGANISM: Status: RESOLVED | Noted: 2023-09-12 | Resolved: 2023-11-21

## 2023-11-21 PROBLEM — R93.1 ABNORMAL SCREENING CT OF HEART: Status: RESOLVED | Noted: 2017-03-02 | Resolved: 2023-11-21

## 2023-11-21 PROBLEM — D12.4 BENIGN NEOPLASM OF DESCENDING COLON: Status: RESOLVED | Noted: 2023-02-08 | Resolved: 2023-11-21

## 2023-11-21 PROBLEM — J45.909 UNCOMPLICATED ASTHMA: Status: RESOLVED | Noted: 2017-03-02 | Resolved: 2023-11-21

## 2023-11-30 ENCOUNTER — PATIENT MESSAGE (OUTPATIENT)
Dept: FAMILY MEDICINE CLINIC | Facility: CLINIC | Age: 78
End: 2023-11-30

## 2023-11-30 RX ORDER — ORAL SEMAGLUTIDE 7 MG/1
7 TABLET ORAL
Qty: 30 TABLET | Refills: 2 | Status: SHIPPED | OUTPATIENT
Start: 2023-11-30 | End: 2023-12-30

## 2023-11-30 NOTE — TELEPHONE ENCOUNTER
From: Ahmet Fresh  To: Shelbie Adams  Sent: 11/30/2023 11:48 AM CST  Subject: Rebowen Mojica:  Need in a few days Horace Zepeda   I laid off 7 from [employer]; but used 3. But ending of what I have on 7 if you want to call in. Thank You.   Miriam Mo

## 2023-12-20 ENCOUNTER — OFFICE VISIT (OUTPATIENT)
Dept: FAMILY MEDICINE CLINIC | Facility: CLINIC | Age: 78
End: 2023-12-20
Payer: MEDICARE

## 2023-12-20 VITALS
TEMPERATURE: 98 F | SYSTOLIC BLOOD PRESSURE: 138 MMHG | BODY MASS INDEX: 30.31 KG/M2 | OXYGEN SATURATION: 98 % | WEIGHT: 200 LBS | HEART RATE: 72 BPM | RESPIRATION RATE: 18 BRPM | DIASTOLIC BLOOD PRESSURE: 80 MMHG | HEIGHT: 68 IN

## 2023-12-20 DIAGNOSIS — R80.9 CONTROLLED TYPE 2 DIABETES MELLITUS WITH MICROALBUMINURIA, WITHOUT LONG-TERM CURRENT USE OF INSULIN  (HCC): Primary | ICD-10-CM

## 2023-12-20 DIAGNOSIS — Z79.899 MEDICATION MANAGEMENT: ICD-10-CM

## 2023-12-20 DIAGNOSIS — E11.29 CONTROLLED TYPE 2 DIABETES MELLITUS WITH MICROALBUMINURIA, WITHOUT LONG-TERM CURRENT USE OF INSULIN  (HCC): Primary | ICD-10-CM

## 2023-12-20 DIAGNOSIS — E66.9 OBESITY (BMI 30.0-34.9): ICD-10-CM

## 2023-12-20 DIAGNOSIS — H61.22 HEARING LOSS OF LEFT EAR DUE TO CERUMEN IMPACTION: ICD-10-CM

## 2023-12-20 RX ORDER — ZOSTER VACCINE RECOMBINANT, ADJUVANTED 50 MCG/0.5
50 KIT INTRAMUSCULAR ONCE
Qty: 2 EACH | Refills: 0 | Status: SHIPPED | OUTPATIENT
Start: 2023-12-20 | End: 2023-12-20

## 2023-12-20 RX ORDER — TRIAMCINOLONE ACETONIDE 1 MG/G
CREAM TOPICAL
COMMUNITY
Start: 2023-12-18

## 2023-12-20 RX ORDER — GLIPIZIDE 5 MG/1
5 TABLET ORAL
COMMUNITY
Start: 2023-12-20

## 2024-01-16 DIAGNOSIS — E11.29 CONTROLLED TYPE 2 DIABETES MELLITUS WITH MICROALBUMINURIA, WITHOUT LONG-TERM CURRENT USE OF INSULIN  (HCC): ICD-10-CM

## 2024-01-16 DIAGNOSIS — R80.9 CONTROLLED TYPE 2 DIABETES MELLITUS WITH MICROALBUMINURIA, WITHOUT LONG-TERM CURRENT USE OF INSULIN  (HCC): ICD-10-CM

## 2024-01-16 RX ORDER — GLIPIZIDE 5 MG/1
5 TABLET ORAL
Qty: 180 TABLET | Refills: 0 | Status: SHIPPED | OUTPATIENT
Start: 2024-01-16

## 2024-01-16 NOTE — TELEPHONE ENCOUNTER
Requested Prescriptions     Signed Prescriptions Disp Refills    GLIPIZIDE 5 MG Oral Tab 180 tablet 0     Sig: TAKE 1 TABLET(5 MG) BY MOUTH TWICE DAILY BEFORE MEALS     Authorizing Provider: DERICK ALVARADO     Ordering User: NOHEMI MORRIS     Refilled per protocol/OV notes

## 2024-01-22 ENCOUNTER — OFFICE VISIT (OUTPATIENT)
Dept: FAMILY MEDICINE CLINIC | Facility: CLINIC | Age: 79
End: 2024-01-22
Payer: MEDICARE

## 2024-01-22 VITALS
BODY MASS INDEX: 30.62 KG/M2 | DIASTOLIC BLOOD PRESSURE: 68 MMHG | WEIGHT: 202 LBS | HEART RATE: 114 BPM | TEMPERATURE: 97 F | RESPIRATION RATE: 18 BRPM | SYSTOLIC BLOOD PRESSURE: 118 MMHG | HEIGHT: 68 IN | OXYGEN SATURATION: 95 %

## 2024-01-22 DIAGNOSIS — L84 PRE-ULCERATIVE CORN OR CALLOUS: ICD-10-CM

## 2024-01-22 DIAGNOSIS — E66.9 OBESITY (BMI 30.0-34.9): ICD-10-CM

## 2024-01-22 DIAGNOSIS — E11.29 CONTROLLED TYPE 2 DIABETES MELLITUS WITH MICROALBUMINURIA, WITHOUT LONG-TERM CURRENT USE OF INSULIN  (HCC): Primary | ICD-10-CM

## 2024-01-22 DIAGNOSIS — R80.9 CONTROLLED TYPE 2 DIABETES MELLITUS WITH MICROALBUMINURIA, WITHOUT LONG-TERM CURRENT USE OF INSULIN  (HCC): Primary | ICD-10-CM

## 2024-01-22 DIAGNOSIS — I87.2 VENOUS INSUFFICIENCY OF BOTH LOWER EXTREMITIES: ICD-10-CM

## 2024-01-22 LAB
CARTRIDGE LOT#: 641 NUMERIC
HEMOGLOBIN A1C: 6.6 % (ref 4.3–5.6)

## 2024-01-22 PROCEDURE — 99214 OFFICE O/P EST MOD 30 MIN: CPT | Performed by: FAMILY MEDICINE

## 2024-01-22 PROCEDURE — 83036 HEMOGLOBIN GLYCOSYLATED A1C: CPT | Performed by: FAMILY MEDICINE

## 2024-01-22 RX ORDER — ORAL SEMAGLUTIDE 7 MG/1
7 TABLET ORAL
Qty: 90 TABLET | Refills: 0 | Status: SHIPPED | OUTPATIENT
Start: 2024-01-22 | End: 2024-02-21
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
## 2024-01-24 ENCOUNTER — TELEPHONE (OUTPATIENT)
Dept: FAMILY MEDICINE CLINIC | Facility: CLINIC | Age: 79
End: 2024-01-24

## 2024-01-24 NOTE — TELEPHONE ENCOUNTER
Faxed over signed request for documentation from certifying physician to [facility] @ [phone]  
yes

## 2024-03-06 DIAGNOSIS — R80.9 CONTROLLED TYPE 2 DIABETES MELLITUS WITH MICROALBUMINURIA, WITHOUT LONG-TERM CURRENT USE OF INSULIN (HCC): ICD-10-CM

## 2024-03-06 DIAGNOSIS — E11.29 CONTROLLED TYPE 2 DIABETES MELLITUS WITH MICROALBUMINURIA, WITHOUT LONG-TERM CURRENT USE OF INSULIN (HCC): ICD-10-CM

## 2024-03-06 RX ORDER — EMPAGLIFLOZIN 25 MG/1
1 TABLET, FILM COATED ORAL DAILY
Qty: 90 TABLET | Refills: 0 | Status: SHIPPED | OUTPATIENT
Start: 2024-03-06

## 2024-03-06 NOTE — TELEPHONE ENCOUNTER
Requested Prescriptions     Signed Prescriptions Disp Refills    Empagliflozin (JARDIANCE) 25 MG Oral Tab 90 tablet 0     Sig: Take 1 tablet by mouth daily.     Authorizing Provider: DERICK ALVARADO     Ordering User: NOHEMI MORRIS    metFORMIN HCl 1000 MG Oral Tab 180 tablet 0     Sig: Take 1 tablet (1,000 mg total) by mouth 2 (two) times daily.     Authorizing Provider: DERICK ALVARADO     Ordering User: NOHEMI MORRIS     Refilled per protocol/OV notes

## 2024-03-15 ENCOUNTER — LAB ENCOUNTER (OUTPATIENT)
Dept: LAB | Age: 79
End: 2024-03-15
Attending: FAMILY MEDICINE
Payer: MEDICARE

## 2024-03-15 DIAGNOSIS — Z12.5 SCREENING PSA (PROSTATE SPECIFIC ANTIGEN): ICD-10-CM

## 2024-03-15 DIAGNOSIS — R80.9 TYPE 2 DIABETES MELLITUS WITH MICROALBUMINURIA, WITHOUT LONG-TERM CURRENT USE OF INSULIN (HCC): ICD-10-CM

## 2024-03-15 DIAGNOSIS — E11.29 TYPE 2 DIABETES MELLITUS WITH MICROALBUMINURIA, WITHOUT LONG-TERM CURRENT USE OF INSULIN (HCC): ICD-10-CM

## 2024-03-15 LAB
COMPLEXED PSA SERPL-MCNC: 0.65 NG/ML (ref ?–4)
CREAT UR-SCNC: 15.3 MG/DL
EST. AVERAGE GLUCOSE BLD GHB EST-MCNC: 143 MG/DL (ref 68–126)
HBA1C MFR BLD: 6.6 % (ref ?–5.7)
MICROALBUMIN UR-MCNC: 0.64 MG/DL
MICROALBUMIN/CREAT 24H UR-RTO: 41.8 UG/MG (ref ?–30)

## 2024-03-15 PROCEDURE — 83036 HEMOGLOBIN GLYCOSYLATED A1C: CPT

## 2024-03-15 PROCEDURE — 82570 ASSAY OF URINE CREATININE: CPT

## 2024-03-15 PROCEDURE — 82043 UR ALBUMIN QUANTITATIVE: CPT

## 2024-03-15 PROCEDURE — 36415 COLL VENOUS BLD VENIPUNCTURE: CPT

## 2024-03-20 ENCOUNTER — OFFICE VISIT (OUTPATIENT)
Dept: FAMILY MEDICINE CLINIC | Facility: CLINIC | Age: 79
End: 2024-03-20
Payer: MEDICARE

## 2024-03-20 VITALS
HEIGHT: 68 IN | WEIGHT: 206 LBS | DIASTOLIC BLOOD PRESSURE: 78 MMHG | OXYGEN SATURATION: 98 % | SYSTOLIC BLOOD PRESSURE: 130 MMHG | RESPIRATION RATE: 16 BRPM | HEART RATE: 88 BPM | TEMPERATURE: 98 F | BODY MASS INDEX: 31.22 KG/M2

## 2024-03-20 DIAGNOSIS — R80.9 CONTROLLED TYPE 2 DIABETES MELLITUS WITH MICROALBUMINURIA, WITHOUT LONG-TERM CURRENT USE OF INSULIN (HCC): Primary | ICD-10-CM

## 2024-03-20 DIAGNOSIS — Z79.899 MEDICATION MANAGEMENT: ICD-10-CM

## 2024-03-20 DIAGNOSIS — E11.29 CONTROLLED TYPE 2 DIABETES MELLITUS WITH MICROALBUMINURIA, WITHOUT LONG-TERM CURRENT USE OF INSULIN (HCC): Primary | ICD-10-CM

## 2024-03-20 DIAGNOSIS — E66.9 OBESITY (BMI 30.0-34.9): ICD-10-CM

## 2024-03-20 PROCEDURE — 99214 OFFICE O/P EST MOD 30 MIN: CPT | Performed by: FAMILY MEDICINE

## 2024-03-20 RX ORDER — ORAL SEMAGLUTIDE 7 MG/1
7 TABLET ORAL
Qty: 30 TABLET | Refills: 5 | Status: SHIPPED | OUTPATIENT
Start: 2024-03-20 | End: 2024-04-19
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
## 2024-05-02 DIAGNOSIS — E11.29 CONTROLLED TYPE 2 DIABETES MELLITUS WITH MICROALBUMINURIA, WITHOUT LONG-TERM CURRENT USE OF INSULIN (HCC): ICD-10-CM

## 2024-05-02 DIAGNOSIS — R80.9 CONTROLLED TYPE 2 DIABETES MELLITUS WITH MICROALBUMINURIA, WITHOUT LONG-TERM CURRENT USE OF INSULIN (HCC): ICD-10-CM

## 2024-05-02 RX ORDER — GLIPIZIDE 5 MG/1
5 TABLET ORAL
Qty: 180 TABLET | Refills: 0 | OUTPATIENT
Start: 2024-05-02

## 2024-05-20 NOTE — TELEPHONE ENCOUNTER
Chronic with exacerbation.  Patient has been seeing a cardiologist in does have some concerns about things that she is read.  We did go over multiple labs and tests from other facilities and other providers.  She is worried about possibly having a clot in her leg.    Wells score of 0 for DVT.   We did however talked about correlation between DVT, smoking, contraceptives.  Have reassured patient have refilled medications.   ----- Message from Noa Cosby PA-C sent at 10/6/2020  6:18 PM CDT -----  Follow-up in the office to discuss test results.  Looks like he has an appointment with Dr. Geni Chung on the 12th please call him to make sure that that is his intent since he siddiqi

## 2024-06-06 DIAGNOSIS — E11.29 CONTROLLED TYPE 2 DIABETES MELLITUS WITH MICROALBUMINURIA, WITHOUT LONG-TERM CURRENT USE OF INSULIN (HCC): ICD-10-CM

## 2024-06-06 DIAGNOSIS — R80.9 CONTROLLED TYPE 2 DIABETES MELLITUS WITH MICROALBUMINURIA, WITHOUT LONG-TERM CURRENT USE OF INSULIN (HCC): ICD-10-CM

## 2024-06-06 RX ORDER — EMPAGLIFLOZIN 25 MG/1
1 TABLET, FILM COATED ORAL DAILY
Qty: 90 TABLET | Refills: 0 | Status: SHIPPED | OUTPATIENT
Start: 2024-06-06

## 2024-07-25 ENCOUNTER — OFFICE VISIT (OUTPATIENT)
Dept: FAMILY MEDICINE CLINIC | Facility: CLINIC | Age: 79
End: 2024-07-25
Payer: MEDICARE

## 2024-07-25 VITALS
SYSTOLIC BLOOD PRESSURE: 118 MMHG | HEART RATE: 68 BPM | WEIGHT: 209 LBS | HEIGHT: 66.54 IN | OXYGEN SATURATION: 95 % | BODY MASS INDEX: 33.19 KG/M2 | DIASTOLIC BLOOD PRESSURE: 78 MMHG | TEMPERATURE: 98 F | RESPIRATION RATE: 18 BRPM

## 2024-07-25 DIAGNOSIS — I77.810 DILATATION OF THORACIC AORTA (HCC): ICD-10-CM

## 2024-07-25 DIAGNOSIS — E11.29 CONTROLLED TYPE 2 DIABETES MELLITUS WITH MICROALBUMINURIA, WITHOUT LONG-TERM CURRENT USE OF INSULIN (HCC): Primary | ICD-10-CM

## 2024-07-25 DIAGNOSIS — Z13.0 SCREENING FOR DEFICIENCY ANEMIA: ICD-10-CM

## 2024-07-25 DIAGNOSIS — I10 ESSENTIAL HYPERTENSION: ICD-10-CM

## 2024-07-25 DIAGNOSIS — I25.10 CORONARY ATHEROSCLEROSIS DUE TO LIPID RICH PLAQUE: ICD-10-CM

## 2024-07-25 DIAGNOSIS — I77.810 AORTIC ECTASIA, THORACIC (HCC): ICD-10-CM

## 2024-07-25 DIAGNOSIS — I47.10 PSVT (PAROXYSMAL SUPRAVENTRICULAR TACHYCARDIA) (HCC): ICD-10-CM

## 2024-07-25 DIAGNOSIS — M21.161 GENU VARUM OF BOTH LOWER EXTREMITIES: ICD-10-CM

## 2024-07-25 DIAGNOSIS — M21.162 GENU VARUM OF BOTH LOWER EXTREMITIES: ICD-10-CM

## 2024-07-25 DIAGNOSIS — E78.5 HYPERLIPIDEMIA LDL GOAL <70: ICD-10-CM

## 2024-07-25 DIAGNOSIS — I65.23 BILATERAL CAROTID ARTERY STENOSIS: ICD-10-CM

## 2024-07-25 DIAGNOSIS — I25.83 CORONARY ATHEROSCLEROSIS DUE TO LIPID RICH PLAQUE: ICD-10-CM

## 2024-07-25 DIAGNOSIS — R80.9 CONTROLLED TYPE 2 DIABETES MELLITUS WITH MICROALBUMINURIA, WITHOUT LONG-TERM CURRENT USE OF INSULIN (HCC): Primary | ICD-10-CM

## 2024-07-25 PROCEDURE — 99215 OFFICE O/P EST HI 40 MIN: CPT | Performed by: FAMILY MEDICINE
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
## 2024-07-26 PROBLEM — I25.10 CORONARY ARTERY DISEASE DUE TO CALCIFIED CORONARY LESION: Status: ACTIVE | Noted: 2024-07-26

## 2024-07-26 PROBLEM — M21.162 GENU VARUM OF BOTH LOWER EXTREMITIES: Status: ACTIVE | Noted: 2024-07-26

## 2024-07-26 PROBLEM — I25.84 CORONARY ARTERY DISEASE DUE TO CALCIFIED CORONARY LESION: Status: ACTIVE | Noted: 2024-07-26

## 2024-07-26 PROBLEM — M21.161 GENU VARUM OF BOTH LOWER EXTREMITIES: Status: ACTIVE | Noted: 2024-07-26

## 2024-07-26 RX ORDER — ROSUVASTATIN CALCIUM 40 MG/1
40 TABLET, COATED ORAL NIGHTLY
Qty: 90 TABLET | Refills: 3 | Status: SHIPPED | OUTPATIENT
Start: 2024-07-26

## 2024-07-26 RX ORDER — ORAL SEMAGLUTIDE 7 MG/1
7 TABLET ORAL
Qty: 90 TABLET | Refills: 0 | COMMUNITY
Start: 2024-07-26 | End: 2024-07-26

## 2024-09-10 ENCOUNTER — MED REC SCAN ONLY (OUTPATIENT)
Dept: FAMILY MEDICINE CLINIC | Facility: CLINIC | Age: 79
End: 2024-09-10

## 2024-09-23 ENCOUNTER — LAB ENCOUNTER (OUTPATIENT)
Dept: LAB | Age: 79
End: 2024-09-23
Attending: FAMILY MEDICINE
Payer: MEDICARE

## 2024-09-23 DIAGNOSIS — E78.5 HYPERLIPIDEMIA LDL GOAL <70: ICD-10-CM

## 2024-09-23 DIAGNOSIS — E11.29 CONTROLLED TYPE 2 DIABETES MELLITUS WITH MICROALBUMINURIA, WITHOUT LONG-TERM CURRENT USE OF INSULIN (HCC): ICD-10-CM

## 2024-09-23 DIAGNOSIS — R80.9 CONTROLLED TYPE 2 DIABETES MELLITUS WITH MICROALBUMINURIA, WITHOUT LONG-TERM CURRENT USE OF INSULIN (HCC): ICD-10-CM

## 2024-09-23 DIAGNOSIS — Z13.0 SCREENING FOR DEFICIENCY ANEMIA: ICD-10-CM

## 2024-09-23 DIAGNOSIS — I10 ESSENTIAL HYPERTENSION: ICD-10-CM

## 2024-09-23 LAB
ALBUMIN SERPL-MCNC: 4.4 G/DL (ref 3.2–4.8)
ALBUMIN/GLOB SERPL: 1.6 {RATIO} (ref 1–2)
ALP LIVER SERPL-CCNC: 70 U/L
ALT SERPL-CCNC: 18 U/L
ANION GAP SERPL CALC-SCNC: 5 MMOL/L (ref 0–18)
AST SERPL-CCNC: 21 U/L (ref ?–34)
BASOPHILS # BLD AUTO: 0.05 X10(3) UL (ref 0–0.2)
BASOPHILS NFR BLD AUTO: 0.9 %
BILIRUB SERPL-MCNC: 0.8 MG/DL (ref 0.2–1.1)
BUN BLD-MCNC: 18 MG/DL (ref 9–23)
CALCIUM BLD-MCNC: 10.1 MG/DL (ref 8.7–10.4)
CHLORIDE SERPL-SCNC: 104 MMOL/L (ref 98–112)
CHOLEST SERPL-MCNC: 189 MG/DL (ref ?–200)
CO2 SERPL-SCNC: 29 MMOL/L (ref 21–32)
CREAT BLD-MCNC: 1.11 MG/DL
CREAT UR-SCNC: 22.2 MG/DL
EGFRCR SERPLBLD CKD-EPI 2021: 69 ML/MIN/1.73M2 (ref 60–?)
EOSINOPHIL # BLD AUTO: 0.2 X10(3) UL (ref 0–0.7)
EOSINOPHIL NFR BLD AUTO: 3.6 %
ERYTHROCYTE [DISTWIDTH] IN BLOOD BY AUTOMATED COUNT: 13.6 %
EST. AVERAGE GLUCOSE BLD GHB EST-MCNC: 143 MG/DL (ref 68–126)
FASTING PATIENT LIPID ANSWER: YES
FASTING STATUS PATIENT QL REPORTED: YES
GLOBULIN PLAS-MCNC: 2.7 G/DL (ref 2–3.5)
GLUCOSE BLD-MCNC: 119 MG/DL (ref 70–99)
HBA1C MFR BLD: 6.6 % (ref ?–5.7)
HCT VFR BLD AUTO: 51.7 %
HDLC SERPL-MCNC: 55 MG/DL (ref 40–59)
HGB BLD-MCNC: 17 G/DL
IMM GRANULOCYTES # BLD AUTO: 0.02 X10(3) UL (ref 0–1)
IMM GRANULOCYTES NFR BLD: 0.4 %
LDLC SERPL CALC-MCNC: 119 MG/DL (ref ?–100)
LYMPHOCYTES # BLD AUTO: 0.65 X10(3) UL (ref 1–4)
LYMPHOCYTES NFR BLD AUTO: 11.7 %
MCH RBC QN AUTO: 29.9 PG (ref 26–34)
MCHC RBC AUTO-ENTMCNC: 32.9 G/DL (ref 31–37)
MCV RBC AUTO: 90.9 FL
MICROALBUMIN UR-MCNC: 0.6 MG/DL
MICROALBUMIN/CREAT 24H UR-RTO: 27 UG/MG (ref ?–30)
MONOCYTES # BLD AUTO: 0.64 X10(3) UL (ref 0.1–1)
MONOCYTES NFR BLD AUTO: 11.5 %
NEUTROPHILS # BLD AUTO: 4.01 X10 (3) UL (ref 1.5–7.7)
NEUTROPHILS # BLD AUTO: 4.01 X10(3) UL (ref 1.5–7.7)
NEUTROPHILS NFR BLD AUTO: 71.9 %
NONHDLC SERPL-MCNC: 134 MG/DL (ref ?–130)
OSMOLALITY SERPL CALC.SUM OF ELEC: 289 MOSM/KG (ref 275–295)
PLATELET # BLD AUTO: 251 10(3)UL (ref 150–450)
POTASSIUM SERPL-SCNC: 4.7 MMOL/L (ref 3.5–5.1)
PROT SERPL-MCNC: 7.1 G/DL (ref 5.7–8.2)
RBC # BLD AUTO: 5.69 X10(6)UL
SODIUM SERPL-SCNC: 138 MMOL/L (ref 136–145)
T4 FREE SERPL-MCNC: 1.5 NG/DL (ref 0.8–1.7)
TRIGL SERPL-MCNC: 82 MG/DL (ref 30–149)
TSI SER-ACNC: 3.31 MIU/ML (ref 0.55–4.78)
VLDLC SERPL CALC-MCNC: 14 MG/DL (ref 0–30)
WBC # BLD AUTO: 5.6 X10(3) UL (ref 4–11)

## 2024-09-23 PROCEDURE — 82570 ASSAY OF URINE CREATININE: CPT

## 2024-09-23 PROCEDURE — 80053 COMPREHEN METABOLIC PANEL: CPT

## 2024-09-23 PROCEDURE — 80061 LIPID PANEL: CPT

## 2024-09-23 PROCEDURE — 84439 ASSAY OF FREE THYROXINE: CPT

## 2024-09-23 PROCEDURE — 84443 ASSAY THYROID STIM HORMONE: CPT

## 2024-09-23 PROCEDURE — 82043 UR ALBUMIN QUANTITATIVE: CPT

## 2024-09-23 PROCEDURE — 36415 COLL VENOUS BLD VENIPUNCTURE: CPT

## 2024-09-23 PROCEDURE — 85025 COMPLETE CBC W/AUTO DIFF WBC: CPT

## 2024-09-23 PROCEDURE — 83036 HEMOGLOBIN GLYCOSYLATED A1C: CPT

## 2024-09-23 NOTE — PROGRESS NOTES
Hemoglobin A1c looks great at 6.6  Red blood cell counts are normal.  White blood cell lymphocyte count has been declining will discuss at office visit possibility of seeing hematologist this has been chronically low over the last 5 years does occasionally go up and then goes back down.  Kidney function, electrolytes and liver function tests are normal  Lipid panel elevated LDL at 119 verify that you are consistently taking the 40 mg of rosuvastatin goal is less than 100 and your LDL.  Decrease saturated fats and avoid processed foods.  Avoid fatty foods/fried foods.  If eating meat try lean cuts of meat such as chicken and fish (salmon and tuna).  If able, add luis seeds, almonds, walnuts, pumpkin seeds, sunflower seeds, beans, fruits, vegetables, avocado steel cut oats, lentils, quinoa, rye, wild rice, whole grain cereals to diet.   Cook with avocado oil, grape seed oil or olive oil. Review the Mediterranean diet on line.    Urine microalbumin creatinine ratio is now normal  Thyroid testing is normal

## 2024-09-26 ENCOUNTER — OFFICE VISIT (OUTPATIENT)
Dept: FAMILY MEDICINE CLINIC | Facility: CLINIC | Age: 79
End: 2024-09-26
Payer: MEDICARE

## 2024-09-26 VITALS
RESPIRATION RATE: 18 BRPM | BODY MASS INDEX: 31.92 KG/M2 | SYSTOLIC BLOOD PRESSURE: 144 MMHG | HEIGHT: 66.5 IN | HEART RATE: 68 BPM | TEMPERATURE: 98 F | DIASTOLIC BLOOD PRESSURE: 84 MMHG | OXYGEN SATURATION: 98 % | WEIGHT: 201 LBS

## 2024-09-26 DIAGNOSIS — E11.29 CONTROLLED TYPE 2 DIABETES MELLITUS WITH MICROALBUMINURIA, WITHOUT LONG-TERM CURRENT USE OF INSULIN (HCC): Primary | ICD-10-CM

## 2024-09-26 DIAGNOSIS — D72.810: ICD-10-CM

## 2024-09-26 DIAGNOSIS — R80.9 CONTROLLED TYPE 2 DIABETES MELLITUS WITH MICROALBUMINURIA, WITHOUT LONG-TERM CURRENT USE OF INSULIN (HCC): Primary | ICD-10-CM

## 2024-09-26 DIAGNOSIS — E78.5 HYPERLIPIDEMIA LDL GOAL <70: ICD-10-CM

## 2024-09-26 PROCEDURE — 99214 OFFICE O/P EST MOD 30 MIN: CPT | Performed by: FAMILY MEDICINE

## 2024-09-26 PROCEDURE — G2211 COMPLEX E/M VISIT ADD ON: HCPCS | Performed by: FAMILY MEDICINE

## 2024-09-26 RX ORDER — ORAL SEMAGLUTIDE 3 MG/1
3 TABLET ORAL EVERY MORNING
Qty: 30 TABLET | Refills: 3 | Status: SHIPPED | OUTPATIENT
Start: 2024-09-26 | End: 2024-10-26
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
## 2024-10-30 ENCOUNTER — OFFICE VISIT (OUTPATIENT)
Dept: FAMILY MEDICINE CLINIC | Facility: CLINIC | Age: 79
End: 2024-10-30
Payer: MEDICARE

## 2024-10-30 VITALS
SYSTOLIC BLOOD PRESSURE: 138 MMHG | OXYGEN SATURATION: 99 % | DIASTOLIC BLOOD PRESSURE: 78 MMHG | WEIGHT: 202 LBS | HEIGHT: 68 IN | TEMPERATURE: 98 F | HEART RATE: 70 BPM | BODY MASS INDEX: 30.62 KG/M2 | RESPIRATION RATE: 17 BRPM

## 2024-10-30 DIAGNOSIS — I10 ESSENTIAL HYPERTENSION: ICD-10-CM

## 2024-10-30 DIAGNOSIS — R80.9 CONTROLLED TYPE 2 DIABETES MELLITUS WITH MICROALBUMINURIA, WITHOUT LONG-TERM CURRENT USE OF INSULIN (HCC): Primary | ICD-10-CM

## 2024-10-30 DIAGNOSIS — E11.29 CONTROLLED TYPE 2 DIABETES MELLITUS WITH MICROALBUMINURIA, WITHOUT LONG-TERM CURRENT USE OF INSULIN (HCC): Primary | ICD-10-CM

## 2024-10-30 DIAGNOSIS — E78.5 HYPERLIPIDEMIA LDL GOAL <70: ICD-10-CM

## 2024-10-30 PROCEDURE — G2211 COMPLEX E/M VISIT ADD ON: HCPCS | Performed by: FAMILY MEDICINE

## 2024-10-30 PROCEDURE — 99214 OFFICE O/P EST MOD 30 MIN: CPT | Performed by: FAMILY MEDICINE

## 2024-10-30 RX ORDER — ORAL SEMAGLUTIDE 3 MG/1
3 TABLET ORAL DAILY
COMMUNITY
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
## 2024-12-10 ENCOUNTER — OFFICE VISIT (OUTPATIENT)
Dept: FAMILY MEDICINE CLINIC | Facility: CLINIC | Age: 79
End: 2024-12-10
Payer: MEDICARE

## 2024-12-10 VITALS
OXYGEN SATURATION: 98 % | BODY MASS INDEX: 32.4 KG/M2 | RESPIRATION RATE: 16 BRPM | HEIGHT: 66.5 IN | WEIGHT: 204 LBS | HEART RATE: 74 BPM | TEMPERATURE: 98 F | SYSTOLIC BLOOD PRESSURE: 110 MMHG | DIASTOLIC BLOOD PRESSURE: 66 MMHG

## 2024-12-10 DIAGNOSIS — I51.7 LVH (LEFT VENTRICULAR HYPERTROPHY): ICD-10-CM

## 2024-12-10 DIAGNOSIS — Z86.0101 HISTORY OF ADENOMATOUS POLYP OF COLON: ICD-10-CM

## 2024-12-10 DIAGNOSIS — I70.0 ATHEROSCLEROSIS OF ABDOMINAL AORTA (HCC): ICD-10-CM

## 2024-12-10 DIAGNOSIS — E66.811 CLASS 1 OBESITY DUE TO EXCESS CALORIES WITH SERIOUS COMORBIDITY AND BODY MASS INDEX (BMI) OF 32.0 TO 32.9 IN ADULT: ICD-10-CM

## 2024-12-10 DIAGNOSIS — D72.810: ICD-10-CM

## 2024-12-10 DIAGNOSIS — I77.810 AORTIC ECTASIA, THORACIC (HCC): ICD-10-CM

## 2024-12-10 DIAGNOSIS — Z86.69 HISTORY OF OBSTRUCTIVE SLEEP APNEA: ICD-10-CM

## 2024-12-10 DIAGNOSIS — I25.84 CORONARY ARTERY DISEASE DUE TO CALCIFIED CORONARY LESION: ICD-10-CM

## 2024-12-10 DIAGNOSIS — I25.10 CORONARY ARTERY DISEASE DUE TO CALCIFIED CORONARY LESION: ICD-10-CM

## 2024-12-10 DIAGNOSIS — Z97.4 WEARS HEARING AID: ICD-10-CM

## 2024-12-10 DIAGNOSIS — J45.40 MODERATE PERSISTENT ASTHMA WITHOUT COMPLICATION (HCC): ICD-10-CM

## 2024-12-10 DIAGNOSIS — I47.10 PSVT (PAROXYSMAL SUPRAVENTRICULAR TACHYCARDIA) (HCC): ICD-10-CM

## 2024-12-10 DIAGNOSIS — R80.9 CONTROLLED TYPE 2 DIABETES MELLITUS WITH MICROALBUMINURIA, WITHOUT LONG-TERM CURRENT USE OF INSULIN (HCC): ICD-10-CM

## 2024-12-10 DIAGNOSIS — Z80.0 FAMILY HISTORY OF COLON CANCER: ICD-10-CM

## 2024-12-10 DIAGNOSIS — I25.83 CORONARY ATHEROSCLEROSIS DUE TO LIPID RICH PLAQUE: ICD-10-CM

## 2024-12-10 DIAGNOSIS — E66.09 CLASS 1 OBESITY DUE TO EXCESS CALORIES WITH SERIOUS COMORBIDITY AND BODY MASS INDEX (BMI) OF 32.0 TO 32.9 IN ADULT: ICD-10-CM

## 2024-12-10 DIAGNOSIS — I10 ESSENTIAL HYPERTENSION: ICD-10-CM

## 2024-12-10 DIAGNOSIS — Z12.5 SCREENING PSA (PROSTATE SPECIFIC ANTIGEN): ICD-10-CM

## 2024-12-10 DIAGNOSIS — Z00.00 ENCOUNTER FOR ANNUAL HEALTH EXAMINATION: Primary | ICD-10-CM

## 2024-12-10 DIAGNOSIS — I65.23 BILATERAL CAROTID ARTERY STENOSIS: ICD-10-CM

## 2024-12-10 DIAGNOSIS — E78.5 HYPERLIPIDEMIA LDL GOAL <70: ICD-10-CM

## 2024-12-10 DIAGNOSIS — I77.810 DILATATION OF THORACIC AORTA (HCC): ICD-10-CM

## 2024-12-10 DIAGNOSIS — E11.29 CONTROLLED TYPE 2 DIABETES MELLITUS WITH MICROALBUMINURIA, WITHOUT LONG-TERM CURRENT USE OF INSULIN (HCC): ICD-10-CM

## 2024-12-10 PROBLEM — Z86.0100 HISTORY OF COLONIC POLYPS: Status: ACTIVE | Noted: 2019-10-31

## 2024-12-10 PROCEDURE — G0439 PPPS, SUBSEQ VISIT: HCPCS | Performed by: FAMILY MEDICINE

## 2024-12-10 PROCEDURE — 99213 OFFICE O/P EST LOW 20 MIN: CPT | Performed by: FAMILY MEDICINE

## 2024-12-10 PROCEDURE — 90662 IIV NO PRSV INCREASED AG IM: CPT | Performed by: FAMILY MEDICINE

## 2024-12-10 PROCEDURE — G0008 ADMIN INFLUENZA VIRUS VAC: HCPCS | Performed by: FAMILY MEDICINE

## 2024-12-10 PROCEDURE — G0009 ADMIN PNEUMOCOCCAL VACCINE: HCPCS | Performed by: FAMILY MEDICINE

## 2024-12-10 PROCEDURE — 90677 PCV20 VACCINE IM: CPT | Performed by: FAMILY MEDICINE

## 2024-12-10 RX ORDER — BLOOD SUGAR DIAGNOSTIC
STRIP MISCELLANEOUS
Qty: 100 STRIP | Refills: 0 | Status: SHIPPED | OUTPATIENT
Start: 2024-12-10

## 2024-12-10 RX ORDER — ALBUTEROL SULFATE 90 UG/1
2 INHALANT RESPIRATORY (INHALATION)
Qty: 1 EACH | Refills: 0 | Status: SHIPPED | OUTPATIENT
Start: 2024-12-10

## 2024-12-10 RX ORDER — BLOOD-GLUCOSE METER
EACH MISCELLANEOUS
Qty: 1 KIT | Refills: 0 | Status: SHIPPED | OUTPATIENT
Start: 2024-12-10

## 2024-12-10 RX ORDER — FLUTICASONE PROPIONATE AND SALMETEROL 250; 50 UG/1; UG/1
1 POWDER RESPIRATORY (INHALATION) EVERY MORNING
Qty: 1 EACH | Refills: 1 | Status: SHIPPED | OUTPATIENT
Start: 2024-12-10
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
## 2024-12-10 NOTE — PATIENT INSTRUCTIONS
Consider tetanus and COVID  shot at pharmacy     Today got the PCV 20 vaccine for pneumonia and influenza vaccine

## 2024-12-23 ENCOUNTER — TELEPHONE (OUTPATIENT)
Dept: FAMILY MEDICINE CLINIC | Facility: CLINIC | Age: 79
End: 2024-12-23

## 2024-12-23 NOTE — TELEPHONE ENCOUNTER
Incoming fax received from: [facility] Medicare Department  Regarding: Diabetic Testing Supplies  Printed and placed in Arlene Alcala PA-C's MA basket

## 2025-01-31 DIAGNOSIS — J45.40 MODERATE PERSISTENT ASTHMA WITHOUT COMPLICATION (HCC): ICD-10-CM

## 2025-01-31 RX ORDER — ALBUTEROL SULFATE 90 UG/1
2 INHALANT RESPIRATORY (INHALATION)
Qty: 8.5 G | Refills: 0 | Status: SHIPPED | OUTPATIENT
Start: 2025-01-31

## 2025-03-15 ENCOUNTER — LAB ENCOUNTER (OUTPATIENT)
Dept: LAB | Age: 80
End: 2025-03-15
Attending: FAMILY MEDICINE
Payer: MEDICARE

## 2025-03-15 DIAGNOSIS — Z12.5 SCREENING PSA (PROSTATE SPECIFIC ANTIGEN): ICD-10-CM

## 2025-03-15 DIAGNOSIS — D72.810: ICD-10-CM

## 2025-03-15 DIAGNOSIS — E78.5 HYPERLIPIDEMIA LDL GOAL <70: ICD-10-CM

## 2025-03-15 DIAGNOSIS — E11.29 CONTROLLED TYPE 2 DIABETES MELLITUS WITH MICROALBUMINURIA, WITHOUT LONG-TERM CURRENT USE OF INSULIN (HCC): ICD-10-CM

## 2025-03-15 DIAGNOSIS — R80.9 CONTROLLED TYPE 2 DIABETES MELLITUS WITH MICROALBUMINURIA, WITHOUT LONG-TERM CURRENT USE OF INSULIN (HCC): ICD-10-CM

## 2025-03-15 LAB
BASOPHILS # BLD AUTO: 0.05 X10(3) UL (ref 0–0.2)
BASOPHILS NFR BLD AUTO: 0.8 %
CHOLEST SERPL-MCNC: 131 MG/DL (ref ?–200)
COMPLEXED PSA SERPL-MCNC: 0.55 NG/ML (ref ?–4)
EOSINOPHIL # BLD AUTO: 0.23 X10(3) UL (ref 0–0.7)
EOSINOPHIL NFR BLD AUTO: 3.8 %
ERYTHROCYTE [DISTWIDTH] IN BLOOD BY AUTOMATED COUNT: 13.2 %
EST. AVERAGE GLUCOSE BLD GHB EST-MCNC: 151 MG/DL (ref 68–126)
FASTING PATIENT LIPID ANSWER: YES
HBA1C MFR BLD: 6.9 % (ref ?–5.7)
HCT VFR BLD AUTO: 50.2 %
HDLC SERPL-MCNC: 49 MG/DL (ref 40–59)
HGB BLD-MCNC: 15.9 G/DL
IMM GRANULOCYTES # BLD AUTO: 0.01 X10(3) UL (ref 0–1)
IMM GRANULOCYTES NFR BLD: 0.2 %
LDLC SERPL CALC-MCNC: 66 MG/DL (ref ?–100)
LYMPHOCYTES # BLD AUTO: 0.86 X10(3) UL (ref 1–4)
LYMPHOCYTES NFR BLD AUTO: 14.4 %
MCH RBC QN AUTO: 29.2 PG (ref 26–34)
MCHC RBC AUTO-ENTMCNC: 31.7 G/DL (ref 31–37)
MCV RBC AUTO: 92.1 FL
MONOCYTES # BLD AUTO: 0.69 X10(3) UL (ref 0.1–1)
MONOCYTES NFR BLD AUTO: 11.5 %
NEUTROPHILS # BLD AUTO: 4.15 X10 (3) UL (ref 1.5–7.7)
NEUTROPHILS # BLD AUTO: 4.15 X10(3) UL (ref 1.5–7.7)
NEUTROPHILS NFR BLD AUTO: 69.3 %
NONHDLC SERPL-MCNC: 82 MG/DL (ref ?–130)
PLATELET # BLD AUTO: 226 10(3)UL (ref 150–450)
RBC # BLD AUTO: 5.45 X10(6)UL
TRIGL SERPL-MCNC: 84 MG/DL (ref 30–149)
VLDLC SERPL CALC-MCNC: 13 MG/DL (ref 0–30)
WBC # BLD AUTO: 6 X10(3) UL (ref 4–11)

## 2025-03-15 PROCEDURE — 80061 LIPID PANEL: CPT

## 2025-03-15 PROCEDURE — 83036 HEMOGLOBIN GLYCOSYLATED A1C: CPT

## 2025-03-15 PROCEDURE — 85025 COMPLETE CBC W/AUTO DIFF WBC: CPT

## 2025-03-15 PROCEDURE — 80053 COMPREHEN METABOLIC PANEL: CPT

## 2025-03-15 PROCEDURE — 36415 COLL VENOUS BLD VENIPUNCTURE: CPT

## 2025-03-16 NOTE — PROGRESS NOTES
Stable hemoglobin A1c.  Repeat again in 6 months.    White blood cell and red blood cell counts are normal.    Prostate blood test normal.    Lipid panel is normal and at goal

## 2025-03-17 ENCOUNTER — OFFICE VISIT (OUTPATIENT)
Dept: FAMILY MEDICINE CLINIC | Facility: CLINIC | Age: 80
End: 2025-03-17
Payer: MEDICARE

## 2025-03-17 DIAGNOSIS — E66.811 CLASS 1 OBESITY DUE TO EXCESS CALORIES WITH SERIOUS COMORBIDITY AND BODY MASS INDEX (BMI) OF 31.0 TO 31.9 IN ADULT: ICD-10-CM

## 2025-03-17 DIAGNOSIS — R26.89 BALANCE PROBLEM: ICD-10-CM

## 2025-03-17 DIAGNOSIS — E11.29 CONTROLLED TYPE 2 DIABETES MELLITUS WITH MICROALBUMINURIA, WITHOUT LONG-TERM CURRENT USE OF INSULIN (HCC): Primary | ICD-10-CM

## 2025-03-17 DIAGNOSIS — E66.09 CLASS 1 OBESITY DUE TO EXCESS CALORIES WITH SERIOUS COMORBIDITY AND BODY MASS INDEX (BMI) OF 31.0 TO 31.9 IN ADULT: ICD-10-CM

## 2025-03-17 DIAGNOSIS — M21.161 GENU VARUM OF BOTH LOWER EXTREMITIES: ICD-10-CM

## 2025-03-17 DIAGNOSIS — R80.9 CONTROLLED TYPE 2 DIABETES MELLITUS WITH MICROALBUMINURIA, WITHOUT LONG-TERM CURRENT USE OF INSULIN (HCC): Primary | ICD-10-CM

## 2025-03-17 DIAGNOSIS — Z79.899 MEDICATION MANAGEMENT: ICD-10-CM

## 2025-03-17 DIAGNOSIS — J45.40 MODERATE PERSISTENT ASTHMA WITHOUT COMPLICATION (HCC): ICD-10-CM

## 2025-03-17 DIAGNOSIS — M17.0 PRIMARY OSTEOARTHRITIS OF BOTH KNEES: ICD-10-CM

## 2025-03-17 DIAGNOSIS — I10 ESSENTIAL HYPERTENSION: ICD-10-CM

## 2025-03-17 DIAGNOSIS — R29.898 WEAKNESS OF BOTH LOWER EXTREMITIES: ICD-10-CM

## 2025-03-17 DIAGNOSIS — E78.5 HYPERLIPIDEMIA LDL GOAL <70: ICD-10-CM

## 2025-03-17 DIAGNOSIS — M21.162 GENU VARUM OF BOTH LOWER EXTREMITIES: ICD-10-CM

## 2025-03-17 LAB
ALBUMIN SERPL-MCNC: 4.5 G/DL (ref 3.2–4.8)
ALBUMIN/GLOB SERPL: 2 {RATIO} (ref 1–2)
ALP LIVER SERPL-CCNC: 66 U/L
ALT SERPL-CCNC: 19 U/L
ANION GAP SERPL CALC-SCNC: 6 MMOL/L (ref 0–18)
AST SERPL-CCNC: 18 U/L (ref ?–34)
BILIRUB SERPL-MCNC: 0.9 MG/DL (ref 0.2–1.1)
BUN BLD-MCNC: 19 MG/DL (ref 9–23)
CALCIUM BLD-MCNC: 9.7 MG/DL (ref 8.7–10.6)
CHLORIDE SERPL-SCNC: 104 MMOL/L (ref 98–112)
CO2 SERPL-SCNC: 32 MMOL/L (ref 21–32)
CREAT BLD-MCNC: 1.28 MG/DL
EGFRCR SERPLBLD CKD-EPI 2021: 58 ML/MIN/1.73M2 (ref 60–?)
GLOBULIN PLAS-MCNC: 2.3 G/DL (ref 2–3.5)
GLUCOSE BLD-MCNC: 107 MG/DL (ref 70–99)
OSMOLALITY SERPL CALC.SUM OF ELEC: 297 MOSM/KG (ref 275–295)
POTASSIUM SERPL-SCNC: 4.5 MMOL/L (ref 3.5–5.1)
PROT SERPL-MCNC: 6.8 G/DL (ref 5.7–8.2)
SODIUM SERPL-SCNC: 142 MMOL/L (ref 136–145)

## 2025-03-17 PROCEDURE — 99215 OFFICE O/P EST HI 40 MIN: CPT | Performed by: FAMILY MEDICINE

## 2025-03-17 PROCEDURE — 99499 UNLISTED E&M SERVICE: CPT | Performed by: FAMILY MEDICINE

## 2025-03-17 PROCEDURE — G2211 COMPLEX E/M VISIT ADD ON: HCPCS | Performed by: FAMILY MEDICINE

## 2025-03-17 RX ORDER — ORAL SEMAGLUTIDE 7 MG/1
7 TABLET ORAL EVERY MORNING
Qty: 30 TABLET | Refills: 5 | Status: SHIPPED | OUTPATIENT
Start: 2025-03-17 | End: 2025-04-16
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
## 2025-03-18 ENCOUNTER — TELEPHONE (OUTPATIENT)
Dept: FAMILY MEDICINE CLINIC | Facility: CLINIC | Age: 80
End: 2025-03-18

## 2025-03-18 VITALS
HEIGHT: 67.5 IN | DIASTOLIC BLOOD PRESSURE: 74 MMHG | SYSTOLIC BLOOD PRESSURE: 134 MMHG | OXYGEN SATURATION: 97 % | RESPIRATION RATE: 18 BRPM | BODY MASS INDEX: 31.49 KG/M2 | HEART RATE: 71 BPM | WEIGHT: 203 LBS

## 2025-03-18 PROBLEM — E66.811 CLASS 1 OBESITY DUE TO EXCESS CALORIES WITH SERIOUS COMORBIDITY AND BODY MASS INDEX (BMI) OF 31.0 TO 31.9 IN ADULT: Status: ACTIVE | Noted: 2017-02-24

## 2025-03-18 PROBLEM — E66.09 CLASS 1 OBESITY DUE TO EXCESS CALORIES WITH SERIOUS COMORBIDITY AND BODY MASS INDEX (BMI) OF 31.0 TO 31.9 IN ADULT: Status: ACTIVE | Noted: 2017-02-24

## 2025-03-18 NOTE — TELEPHONE ENCOUNTER
Called patient to let him know his urine didn't get sent out to the lab yesterday at his visit and that he would need to go to the lab to give a new sample and order was in the system already. Patient verbally understood the plan of care and agreed to go the lab to give new urine specimen.

## 2025-03-19 ENCOUNTER — LAB ENCOUNTER (OUTPATIENT)
Dept: LAB | Age: 80
End: 2025-03-19
Attending: FAMILY MEDICINE
Payer: MEDICARE

## 2025-03-19 DIAGNOSIS — E11.29 CONTROLLED TYPE 2 DIABETES MELLITUS WITH MICROALBUMINURIA, WITHOUT LONG-TERM CURRENT USE OF INSULIN (HCC): ICD-10-CM

## 2025-03-19 DIAGNOSIS — R80.9 CONTROLLED TYPE 2 DIABETES MELLITUS WITH MICROALBUMINURIA, WITHOUT LONG-TERM CURRENT USE OF INSULIN (HCC): ICD-10-CM

## 2025-03-19 LAB
CREAT UR-SCNC: 44.4 MG/DL
MICROALBUMIN UR-MCNC: 0.8 MG/DL
MICROALBUMIN/CREAT 24H UR-RTO: 18 UG/MG (ref ?–30)

## 2025-03-19 PROCEDURE — 82043 UR ALBUMIN QUANTITATIVE: CPT

## 2025-03-19 PROCEDURE — 82570 ASSAY OF URINE CREATININE: CPT

## 2025-03-26 ENCOUNTER — TELEPHONE (OUTPATIENT)
Dept: FAMILY MEDICINE CLINIC | Facility: CLINIC | Age: 80
End: 2025-03-26

## 2025-03-26 NOTE — TELEPHONE ENCOUNTER
Patient calling and would like to speak to nurse to clarify his Semaglutide (RYBELSUS) 7 MG Oral Tab rx - or should he take Rybelsus 3mg  twice a day?  It was not clear in his ov summary     Please advise

## 2025-03-26 NOTE — TELEPHONE ENCOUNTER
Prior authorization approved  Payer: [employer] Part D Case ID: [id number]    [phone]    [phone]  Note from payer: Request Reference Number: [id number].  SHAUNNA     TAB 7MG is approved through 12/31/2025.  Your patient may now fill this prescription and it will be covered.  Approval Details    Authorization number: [id number]  Authorized from March 26, 2025 to December 31, 2025  Electronic appeal: Not supported  View History    Shaunna LINTON to pt

## 2025-06-11 ENCOUNTER — OFFICE VISIT (OUTPATIENT)
Dept: FAMILY MEDICINE CLINIC | Facility: CLINIC | Age: 80
End: 2025-06-11
Payer: MEDICARE

## 2025-06-11 VITALS
HEART RATE: 76 BPM | HEIGHT: 67.5 IN | DIASTOLIC BLOOD PRESSURE: 76 MMHG | SYSTOLIC BLOOD PRESSURE: 124 MMHG | TEMPERATURE: 98 F | OXYGEN SATURATION: 98 % | BODY MASS INDEX: 32.11 KG/M2 | RESPIRATION RATE: 16 BRPM | WEIGHT: 207 LBS

## 2025-06-11 DIAGNOSIS — E66.09 CLASS 1 OBESITY DUE TO EXCESS CALORIES WITH SERIOUS COMORBIDITY AND BODY MASS INDEX (BMI) OF 31.0 TO 31.9 IN ADULT: ICD-10-CM

## 2025-06-11 DIAGNOSIS — I49.3 PVC (PREMATURE VENTRICULAR CONTRACTION): ICD-10-CM

## 2025-06-11 DIAGNOSIS — I49.9 IRREGULAR HEART BEAT: ICD-10-CM

## 2025-06-11 DIAGNOSIS — E11.29 CONTROLLED TYPE 2 DIABETES MELLITUS WITH MICROALBUMINURIA, WITHOUT LONG-TERM CURRENT USE OF INSULIN (HCC): Primary | ICD-10-CM

## 2025-06-11 DIAGNOSIS — I49.1 PAC (PREMATURE ATRIAL CONTRACTION): ICD-10-CM

## 2025-06-11 DIAGNOSIS — E66.811 CLASS 1 OBESITY DUE TO EXCESS CALORIES WITH SERIOUS COMORBIDITY AND BODY MASS INDEX (BMI) OF 31.0 TO 31.9 IN ADULT: ICD-10-CM

## 2025-06-11 DIAGNOSIS — R80.9 CONTROLLED TYPE 2 DIABETES MELLITUS WITH MICROALBUMINURIA, WITHOUT LONG-TERM CURRENT USE OF INSULIN (HCC): Primary | ICD-10-CM

## 2025-06-11 DIAGNOSIS — I87.2 VENOUS INSUFFICIENCY OF BOTH LOWER EXTREMITIES: ICD-10-CM

## 2025-06-11 RX ORDER — GARLIC EXTRACT 500 MG
1 CAPSULE ORAL DAILY
COMMUNITY

## 2025-06-11 RX ORDER — ORAL SEMAGLUTIDE 7 MG/1
7 TABLET ORAL
COMMUNITY
Start: 2025-06-11
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
## 2025-06-13 PROBLEM — I49.1 PAC (PREMATURE ATRIAL CONTRACTION): Status: ACTIVE | Noted: 2025-06-13

## 2025-06-13 PROBLEM — I49.3 PVC (PREMATURE VENTRICULAR CONTRACTION): Status: ACTIVE | Noted: 2025-06-13

## 2025-06-13 LAB
ATRIAL RATE: 79 BPM
P AXIS: 76 DEGREES
P-R INTERVAL: 142 MS
Q-T INTERVAL: 406 MS
QRS DURATION: 80 MS
QTC CALCULATION (BEZET): 465 MS
R AXIS: -76 DEGREES
T AXIS: 37 DEGREES
VENTRICULAR RATE: 79 BPM

## 2025-06-22 DIAGNOSIS — R80.9 CONTROLLED TYPE 2 DIABETES MELLITUS WITH MICROALBUMINURIA, WITHOUT LONG-TERM CURRENT USE OF INSULIN (HCC): ICD-10-CM

## 2025-06-22 DIAGNOSIS — E11.29 CONTROLLED TYPE 2 DIABETES MELLITUS WITH MICROALBUMINURIA, WITHOUT LONG-TERM CURRENT USE OF INSULIN (HCC): ICD-10-CM

## (undated) DIAGNOSIS — J45.20 MILD INTERMITTENT ASTHMA WITHOUT COMPLICATION: ICD-10-CM

## (undated) DIAGNOSIS — E11.65 UNCONTROLLED TYPE 2 DIABETES MELLITUS WITH HYPERGLYCEMIA (HCC): ICD-10-CM

## (undated) DIAGNOSIS — E78.5 HYPERLIPIDEMIA LDL GOAL <70: ICD-10-CM

## (undated) DIAGNOSIS — I10 ESSENTIAL HYPERTENSION: ICD-10-CM

## (undated) NOTE — Clinical Note
05/08/2017            Dear James Olivier: In an effort to provide you with the best possible care for your diabetes, we ask that you please schedule the following appointment(s) as indicated below.   Our records indicate that you are in need of the f

## (undated) NOTE — MR AVS SNAPSHOT
[facility]  [phone]               Thank you for choosing us for your health care visit with Ernesto Lion PA-C.   We are glad to serve you and happy to provide you with [facility] Cardio Referral - In Network    Complete by:  As directed    Assoc Dx:  Abnormal CT scan, heart [R93.1], Dilation of thoracic aorta ([address] [I77.810]           ENT Referral - In Network    Complete by:  As directed    Assoc Dx:   Hearing decreased, bilateral ENT - INTERNAL [[id number] CUSTOM]  Order #:  [id number]         **REFERRAL REQUEST**    Your physician has referred you to a specialist.  Your physician or the clinic staff will provide you with the phone number you should call to schedule your appointment. 116/70 mmHg 88 67.75\" 225 lb 34.46 kg/m2         Current Medications          This list is accurate as of: 4/17/17 12:40 PM.  Always use your most recent med list.                aspirin 81 MG Tabs   Take 81 mg by mouth daily.            Atorvastatin Calc Expires: 4/24/2017  9:12 AM    If you have questions, you can call [phone] to talk to our [facility] Staff. Remember, [employer] is NOT to be used for urgent needs. For medical emergencies, dial 911.            Visit Navidog

## (undated) NOTE — LETTER
07/03/18              Dear Fredi Baum: In an effort to provide you with the best possible care for your diabetes, we ask that you please schedule the following appointment(s) as indicated below.   Our records indicate that you are in need of the f

## (undated) NOTE — MR AVS SNAPSHOT
[facility]  [phone]               Thank you for choosing us for your health care visit with Marylou Olivier PA-C.   We are glad to serve you and happy to provide you with [facility] Diverticulitis of colon    Essential hypertension    Fatigue    Hearing impairment    Obesity (BMI 30.0-34.9)    Obstructive sleep apnea    Type 2 diabetes mellitus without complication, without long-term current use of insulin    Encounter for annual hea lipids (cholesterol), or weight are high or if you have a family history of type 2 diabetes   Colorectal cancer test: if you are 48 or older.  Recommended tests include a yearly test for blood in the stool, called the fecal occult blood test (FOBT) or fecal Hearing test: if you are 72 or older   Vision test: if you are 72 or older. Remember, these are the minimum recommendations for routine tests.  You and your healthcare provider must discuss what is right for you based on your symptoms and your personal an partner, have a sexually transmitted infection, abuse IV drugs, or plan to travel where hepatitis B is common. Pneumococcal pneumonia shot if you are age 72 or older.  You may need to get it at a younger age if you have a high-risk medical condition, such your insurance carrier before scheduling to verify coverage.     PREVENTATIVE SERVICES  INDICATIONS AND SCHEDULE Internal Lab or Procedure External Lab or Procedure   Diabetes Screening      HbgA1C     At Least  Annually for Diabetics HGBA1C (%)   Date Valu this or any previous visit. No flowsheet data found. Fecal Occult Blood   Covered Annually No results found for: FOB, OCCULTSTOOL No flowsheet data found.      Barium Enema-   uncomfortable but covered  Covered but uncomfortable   Glaucoma Screening previous visit. This may be covered with your pharmacy  prescription benefits     Recommended Websites for Advanced Directives    [email or web address] pdf  An information packet, including necessary form from the [place] Take 10 mg by mouth nightly.    Commonly known as:  SINGULAIR                Where to Get Your Medications      These medications were sent to [address], [phone], [phone] [address]

## (undated) NOTE — LETTER
[facility]     : 1945     Date: 9/15/2021  Provider:  Richa Mayo PA-C  Phone for doctor or clinic: [address] [place] [address], [address] [place] [address], [address] [place] [address] 6  [phone]

## (undated) NOTE — LETTER
07/24/18 [facility]      Dear Olga Harding,    Our records indicate that you have outstanding lab work and or testing that was ordered for you and has not yet been completed:          Comp Metabolic Panel (14) [E]

## (undated) NOTE — MR AVS SNAPSHOT
[facility] [address] [place] [id number] [phone]               Thank you for choosing us for your health care visit with Thuan Medina RN,CDE.   We are glad to serve you and happy to provide you with this summary Commonly known as:  INDIRA CONTOUR NEXT TEST           JANUVIA 100 MG Tabs   Generic drug:  SITagliptin Phosphate   TAKE 1 TABLET BY MOUTH DAILY           lisinopril 5 MG Tabs   TAKE 1 TABLET BY MOUTH DAILY   Commonly known as:  Robert Baeza

## (undated) NOTE — LETTER
07/27/17            Dear Saira Vasquez: In an effort to provide you with the best possible care for your diabetes, we ask that you please schedule the following appointment(s) as indicated below.   Our records indicate that you are in need of the fol

## (undated) NOTE — LETTER
02/19/18            Dear Raymond Boucher: In an effort to provide you with the best possible care for your diabetes, we ask that you please schedule the following appointment(s) as indicated below.   Our records indicate that you are in need of the fol

## (undated) NOTE — MR AVS SNAPSHOT
[facility] [address] [address]  [phone]               Thank you for choosing us for your health care visit with Aneta Linn RN,CDE.   We are glad to serve you and happy to provide you with this summary results and a  list of medications patient is taking. Should you have questions about insurance coverage, call the [phone] number on the back of your insurance card. BILLING CODES These classes are billed through [address] is the billing code. INDIRA CONTOUR NEXT CONTROL Low Soln   1 drop by In Vitro route as needed.  Test 1st strip out of each new vial;discard solution 90 days after opening  Dx Code: E11.65 NIDDM           INDIRA MICROLET LANCETS Misc   Test twice daily Dx Code: 11.65 NIDDM your Zip Code and Date of Birth to complete the sign-up process. If you do not sign up before the expiration date, you must request a new code.     Your unique [employer] Access Code: [email or web address]  Expires: 4/24/2017  8:12 AM    If you have questions, you can c

## (undated) NOTE — LETTER
12/04/19              Dear Madison Figueroa: In an effort to provide you with the best possible care for your diabetes, we ask that you please schedule the following appointment(s) as indicated below.   Our records indicate that you are in need of the f

## (undated) NOTE — MR AVS SNAPSHOT
[facility] [place] [address]  [facility] [address] [phone]               Thank you for choosing us for your health care visit with Ary Osuna RN,CDE.   We are glad to serve you and happy to provide you with this summ Inhale 1 puff into the lungs every 12 (twelve) hours.    Commonly known as:  ADVAIR DISKUS           Glucose Blood Strp   Test twice daily  Dx Code: E11.65 NIDDM   Commonly known as:  Databanq CONTOUR NEXT TEST           JANUVIA 100 MG Tabs   Generic drug:  SI

## (undated) NOTE — MR AVS SNAPSHOT
[facility]  [phone]               Thank you for choosing us for your health care visit with Petar Sheehan PA-C.   We are glad to serve you and happy to provide you with [facility] Current Medications          This list is accurate as of: 2/23/17 11:59 PM.  Always use your most recent med list.                aspirin 81 MG Tabs   Take 81 mg by mouth daily.            Atorvastatin Calcium 20 MG Tabs   Take 1 tablet (20 mg total) by belgica Enter your [employer] Activation Code exactly as it appears below along with your Zip Code and Date of Birth to complete the sign-up process. If you do not sign up before the expiration date, you must request a new code.     Your unique [employer] Access Code: [email or web address] Visit [facility] online at  [email or web address]

## (undated) NOTE — LETTER
[facility] ACTION PLAN for Buster Lepe     : 1945     Date: 10/16/2020  Provider:  Emily Munguia PA-C  Phone for doctor or clinic: [facility], [address] [place] [address], [address] [place] [address], [address]  [phone]

## (undated) NOTE — LETTER
01/20/20        Pr-14  [id number] [id number]      Dear Isabel Level,    Our records indicate that you have outstanding lab work and or testing that was ordered for you and has not yet been completed:       CBC With Differential With Plate

## (undated) NOTE — MR AVS SNAPSHOT
[facility]  [phone]               Thank you for choosing us for your health care visit with Della Aguilar PA-C.   We are glad to serve you and happy to provide you with [facility] aspirin 81 MG Tabs   Take 81 mg by mouth daily. Atorvastatin Calcium 20 MG Tabs   Take 1 tablet (20 mg total) by mouth daily. Commonly known as:  LIPITOR           redBus.in CONTOUR NEXT CONTROL Low Soln   1 drop by In Vitro route as needed.  Test Enter your [employer] Activation Code exactly as it appears below along with your Zip Code and Date of Birth to complete the sign-up process. If you do not sign up before the expiration date, you must request a new code.     Your unique [employer] Access Code: [email or web address]

## (undated) NOTE — LETTER
03/16/21              Dear Aleah Mcbride: In an effort to provide you with the best possible care for your diabetes, we ask that you please schedule the following appointment(s) as indicated below.   Our records indicate that you are in need of the following:

## (undated) NOTE — LETTER
09/01/20              Dear Liudmila Nixon: In an effort to provide you with the best possible care for your diabetes, we ask that you please schedule the following appointment(s) as indicated below.   Our records indicate that you are in need of the f